# Patient Record
Sex: FEMALE | Race: WHITE | NOT HISPANIC OR LATINO | Employment: OTHER | URBAN - NONMETROPOLITAN AREA
[De-identification: names, ages, dates, MRNs, and addresses within clinical notes are randomized per-mention and may not be internally consistent; named-entity substitution may affect disease eponyms.]

---

## 2023-04-21 PROBLEM — E78.00 HYPERCHOLESTEROLEMIA: Status: ACTIVE | Noted: 2023-04-21

## 2023-04-21 PROBLEM — J01.90 ACUTE SINUSITIS: Status: ACTIVE | Noted: 2023-04-21

## 2023-04-21 PROBLEM — E55.9 VITAMIN D DEFICIENCY: Status: ACTIVE | Noted: 2023-04-21

## 2023-04-21 PROBLEM — R73.9 HYPERGLYCEMIA: Status: ACTIVE | Noted: 2023-04-21

## 2023-04-21 PROBLEM — M85.80 OSTEOPENIA: Status: ACTIVE | Noted: 2023-04-21

## 2023-04-21 PROBLEM — R53.83 FATIGUE: Status: ACTIVE | Noted: 2023-04-21

## 2023-04-21 PROBLEM — R92.8 ABNORMAL MAMMOGRAM: Status: ACTIVE | Noted: 2023-04-21

## 2023-04-21 PROBLEM — E66.01 OBESITY, MORBID (MORE THAN 100 LBS OVER IDEAL WEIGHT OR BMI > 40) (MULTI): Status: ACTIVE | Noted: 2023-04-21

## 2023-04-21 PROBLEM — M19.90 ARTHRITIS: Status: ACTIVE | Noted: 2023-04-21

## 2023-04-21 PROBLEM — J18.9 PNEUMONIA: Status: ACTIVE | Noted: 2023-04-21

## 2023-04-21 PROBLEM — I10 HYPERTENSION: Status: ACTIVE | Noted: 2023-04-21

## 2023-04-21 PROBLEM — M25.531 RIGHT WRIST PAIN: Status: ACTIVE | Noted: 2023-04-21

## 2023-04-21 PROBLEM — R05.9 COUGH: Status: ACTIVE | Noted: 2023-04-21

## 2023-04-21 PROBLEM — R31.9 HEMATURIA: Status: ACTIVE | Noted: 2023-04-21

## 2023-04-21 PROBLEM — M81.0 POST-MENOPAUSAL OSTEOPOROSIS: Status: ACTIVE | Noted: 2023-04-21

## 2023-04-21 PROBLEM — C02.9 MALIGNANT NEOPLASM OF TONGUE (MULTI): Status: ACTIVE | Noted: 2023-04-21

## 2023-04-21 PROBLEM — E87.6 HYPOKALEMIA: Status: ACTIVE | Noted: 2023-04-21

## 2023-04-21 PROBLEM — L03.119 CELLULITIS OF ANKLE: Status: ACTIVE | Noted: 2023-04-21

## 2023-04-21 PROBLEM — J30.9 ALLERGIC RHINITIS: Status: ACTIVE | Noted: 2023-04-21

## 2023-04-21 PROBLEM — L25.9 CONTACT DERMATITIS: Status: ACTIVE | Noted: 2023-04-21

## 2023-04-21 RX ORDER — CHOLECALCIFEROL (VITAMIN D3) 125 MCG
125 CAPSULE ORAL
COMMUNITY
Start: 2014-04-07

## 2023-04-21 RX ORDER — ASPIRIN 81 MG/1
TABLET ORAL
COMMUNITY
End: 2024-01-29 | Stop reason: ALTCHOICE

## 2023-04-21 RX ORDER — HYDROCHLOROTHIAZIDE 25 MG/1
1 TABLET ORAL DAILY
COMMUNITY
Start: 2019-05-16 | End: 2023-04-27 | Stop reason: SDUPTHER

## 2023-04-27 ENCOUNTER — OFFICE VISIT (OUTPATIENT)
Dept: PRIMARY CARE | Facility: CLINIC | Age: 68
End: 2023-04-27
Payer: MEDICARE

## 2023-04-27 VITALS
SYSTOLIC BLOOD PRESSURE: 158 MMHG | DIASTOLIC BLOOD PRESSURE: 78 MMHG | WEIGHT: 277.8 LBS | HEART RATE: 81 BPM | BODY MASS INDEX: 45.18 KG/M2 | OXYGEN SATURATION: 96 %

## 2023-04-27 DIAGNOSIS — M85.859 OSTEOPENIA OF NECK OF FEMUR, UNSPECIFIED LATERALITY: ICD-10-CM

## 2023-04-27 DIAGNOSIS — E55.9 VITAMIN D DEFICIENCY: ICD-10-CM

## 2023-04-27 DIAGNOSIS — E66.01 CLASS 3 SEVERE OBESITY DUE TO EXCESS CALORIES WITH SERIOUS COMORBIDITY AND BODY MASS INDEX (BMI) OF 45.0 TO 49.9 IN ADULT (MULTI): ICD-10-CM

## 2023-04-27 DIAGNOSIS — C02.9 MALIGNANT NEOPLASM OF TONGUE (MULTI): ICD-10-CM

## 2023-04-27 DIAGNOSIS — I10 HYPERTENSION, UNSPECIFIED TYPE: Primary | ICD-10-CM

## 2023-04-27 DIAGNOSIS — E78.00 HYPERCHOLESTEROLEMIA: ICD-10-CM

## 2023-04-27 PROBLEM — H25.13 CATARACT, NUCLEAR SCLEROTIC SENILE, BILATERAL: Status: ACTIVE | Noted: 2018-03-22

## 2023-04-27 PROBLEM — H35.372 EPIRETINAL MEMBRANE (ERM) OF LEFT EYE: Status: ACTIVE | Noted: 2018-03-22

## 2023-04-27 PROBLEM — E66.813 OBESITY, CLASS III, BMI 40-49.9 (MORBID OBESITY): Status: ACTIVE | Noted: 2018-04-09

## 2023-04-27 PROCEDURE — 3077F SYST BP >= 140 MM HG: CPT | Performed by: INTERNAL MEDICINE

## 2023-04-27 PROCEDURE — 1160F RVW MEDS BY RX/DR IN RCRD: CPT | Performed by: INTERNAL MEDICINE

## 2023-04-27 PROCEDURE — 3078F DIAST BP <80 MM HG: CPT | Performed by: INTERNAL MEDICINE

## 2023-04-27 PROCEDURE — 1036F TOBACCO NON-USER: CPT | Performed by: INTERNAL MEDICINE

## 2023-04-27 PROCEDURE — 3008F BODY MASS INDEX DOCD: CPT | Performed by: INTERNAL MEDICINE

## 2023-04-27 PROCEDURE — 1159F MED LIST DOCD IN RCRD: CPT | Performed by: INTERNAL MEDICINE

## 2023-04-27 PROCEDURE — 99214 OFFICE O/P EST MOD 30 MIN: CPT | Performed by: INTERNAL MEDICINE

## 2023-04-27 RX ORDER — OFLOXACIN 3 MG/ML
SOLUTION/ DROPS OPHTHALMIC
COMMUNITY
Start: 2018-03-27 | End: 2023-07-24

## 2023-04-27 RX ORDER — FUROSEMIDE 20 MG/1
20 TABLET ORAL 2 TIMES DAILY
COMMUNITY
End: 2023-07-24

## 2023-04-27 RX ORDER — KETOROLAC TROMETHAMINE 5 MG/ML
SOLUTION OPHTHALMIC
COMMUNITY
Start: 2018-03-27 | End: 2023-07-24

## 2023-04-27 RX ORDER — HYDROCHLOROTHIAZIDE 25 MG/1
25 TABLET ORAL DAILY
Qty: 90 TABLET | Refills: 0 | Status: SHIPPED | OUTPATIENT
Start: 2023-04-27 | End: 2023-07-24 | Stop reason: SDUPTHER

## 2023-04-27 RX ORDER — VALSARTAN 160 MG/1
160 TABLET ORAL
COMMUNITY
Start: 2018-02-14 | End: 2023-04-27 | Stop reason: SDUPTHER

## 2023-04-27 NOTE — PROGRESS NOTES
Subjective   Patient ID: Joy L Pallant is a 68 y.o. female who presents for Follow-up (3 mth medical management).    HPI  Patient here for routine follow-up    Mammogram and bone density reviewed    Hypertension on therapy no side effects  Home /66 this morning    History of tongue cancer stable ENT following    Hypercholesterolemia on diet    Osteopenia  /  Vitamin D deficiency on supplementation no side effects    Diet and exercise reviewed    Review of Systems   All other systems reviewed and are negative.      Objective   Physical Exam  Vitals reviewed.   Constitutional:       Appearance: Normal appearance. She is obese.   HENT:      Head: Normocephalic and atraumatic.      Mouth/Throat:      Pharynx: No posterior oropharyngeal erythema.   Eyes:      General: No scleral icterus.     Conjunctiva/sclera: Conjunctivae normal.      Pupils: Pupils are equal, round, and reactive to light.   Cardiovascular:      Rate and Rhythm: Normal rate and regular rhythm.      Heart sounds: Normal heart sounds.   Pulmonary:      Effort: No respiratory distress.      Breath sounds: No wheezing.   Abdominal:      General: Abdomen is flat. Bowel sounds are normal. There is no distension.      Palpations: Abdomen is soft. There is no mass.      Tenderness: There is no abdominal tenderness. There is no rebound.   Musculoskeletal:         General: Normal range of motion.      Cervical back: Normal range of motion and neck supple.   Skin:     General: Skin is warm and dry.   Neurological:      General: No focal deficit present.      Mental Status: She is alert and oriented to person, place, and time. Mental status is at baseline.   Psychiatric:         Mood and Affect: Mood normal.         Behavior: Behavior normal.         Thought Content: Thought content normal.         Judgment: Judgment normal.         Assessment/Plan   Problem List Items Addressed This Visit          Circulatory    Hypertension - Primary    Relevant  Medications    hydroCHLOROthiazide (HYDRODiuril) 25 mg tablet       Musculoskeletal    Osteopenia       Endocrine/Metabolic    Vitamin D deficiency       Other    Hypercholesterolemia    Malignant neoplasm of tongue (CMS/[facility])     Other Visit Diagnoses       Class 3 severe obesity due to excess calories with serious comorbidity and body mass index (BMI) of 45.0 to 49.9 in adult (CMS/[facility])              Mammogram and bone density reviewed    Hypertension on therapy no side effects  Home /66 this morning    History of tongue cancer stable ENT following    Hypercholesterolemia on diet    Osteopenia  /  Vitamin D deficiency on supplementation no side effects    Diet and exercise reviewed    Follow up 3 months / medicare physical

## 2023-05-25 RX ORDER — VALSARTAN 160 MG/1
160 TABLET ORAL
Qty: 90 TABLET | Refills: 0 | Status: SHIPPED | OUTPATIENT
Start: 2023-05-25 | End: 2023-07-24

## 2023-05-30 NOTE — TELEPHONE ENCOUNTER
PATIENT CALLED INTO THE ANSWERING SERVICE OVER THE LONG WEEKEND LETTING US KNOW THAT AN RX WAS AT HER PHARMACY THAT SHE DOES NOT NEED/ OR TAKE SO I CALLED THE PATIENT ON 5/30/2023 @ 8:15a TO SPEAK WITH HER TO GET MORE INFORMATION ON WHAT THE RX IS

## 2023-07-24 ENCOUNTER — OFFICE VISIT (OUTPATIENT)
Dept: PRIMARY CARE | Facility: CLINIC | Age: 68
End: 2023-07-24
Payer: MEDICARE

## 2023-07-24 VITALS
DIASTOLIC BLOOD PRESSURE: 82 MMHG | WEIGHT: 278.4 LBS | HEART RATE: 80 BPM | BODY MASS INDEX: 44.74 KG/M2 | HEIGHT: 66 IN | SYSTOLIC BLOOD PRESSURE: 142 MMHG | OXYGEN SATURATION: 96 %

## 2023-07-24 DIAGNOSIS — E55.9 VITAMIN D DEFICIENCY: ICD-10-CM

## 2023-07-24 DIAGNOSIS — I10 HYPERTENSION, UNSPECIFIED TYPE: ICD-10-CM

## 2023-07-24 DIAGNOSIS — R73.9 HYPERGLYCEMIA: ICD-10-CM

## 2023-07-24 DIAGNOSIS — Z00.00 ENCOUNTER FOR ANNUAL WELLNESS VISIT (AWV) IN MEDICARE PATIENT: Primary | ICD-10-CM

## 2023-07-24 DIAGNOSIS — E66.01 CLASS 3 SEVERE OBESITY DUE TO EXCESS CALORIES WITH SERIOUS COMORBIDITY AND BODY MASS INDEX (BMI) OF 40.0 TO 44.9 IN ADULT (MULTI): ICD-10-CM

## 2023-07-24 DIAGNOSIS — E78.00 HYPERCHOLESTEREMIA: ICD-10-CM

## 2023-07-24 DIAGNOSIS — C02.9 MALIGNANT NEOPLASM OF TONGUE (MULTI): ICD-10-CM

## 2023-07-24 PROCEDURE — 99215 OFFICE O/P EST HI 40 MIN: CPT | Performed by: INTERNAL MEDICINE

## 2023-07-24 PROCEDURE — 3008F BODY MASS INDEX DOCD: CPT | Performed by: INTERNAL MEDICINE

## 2023-07-24 PROCEDURE — 3079F DIAST BP 80-89 MM HG: CPT | Performed by: INTERNAL MEDICINE

## 2023-07-24 PROCEDURE — 3077F SYST BP >= 140 MM HG: CPT | Performed by: INTERNAL MEDICINE

## 2023-07-24 PROCEDURE — G0439 PPPS, SUBSEQ VISIT: HCPCS | Performed by: INTERNAL MEDICINE

## 2023-07-24 PROCEDURE — 1160F RVW MEDS BY RX/DR IN RCRD: CPT | Performed by: INTERNAL MEDICINE

## 2023-07-24 PROCEDURE — 1036F TOBACCO NON-USER: CPT | Performed by: INTERNAL MEDICINE

## 2023-07-24 PROCEDURE — G0447 BEHAVIOR COUNSEL OBESITY 15M: HCPCS | Performed by: INTERNAL MEDICINE

## 2023-07-24 PROCEDURE — 1170F FXNL STATUS ASSESSED: CPT | Performed by: INTERNAL MEDICINE

## 2023-07-24 PROCEDURE — 1159F MED LIST DOCD IN RCRD: CPT | Performed by: INTERNAL MEDICINE

## 2023-07-24 RX ORDER — HYDROCHLOROTHIAZIDE 25 MG/1
25 TABLET ORAL DAILY
Qty: 90 TABLET | Refills: 0 | Status: SHIPPED | OUTPATIENT
Start: 2023-07-24 | End: 2023-10-09 | Stop reason: SDUPTHER

## 2023-07-24 ASSESSMENT — ACTIVITIES OF DAILY LIVING (ADL)
TAKING_MEDICATION: INDEPENDENT
GROCERY_SHOPPING: INDEPENDENT
DOING_HOUSEWORK: INDEPENDENT
MANAGING_FINANCES: INDEPENDENT
BATHING: INDEPENDENT
DRESSING: INDEPENDENT

## 2023-07-24 ASSESSMENT — ENCOUNTER SYMPTOMS
LOSS OF SENSATION IN FEET: 0
DEPRESSION: 0
OCCASIONAL FEELINGS OF UNSTEADINESS: 0

## 2023-07-24 ASSESSMENT — PATIENT HEALTH QUESTIONNAIRE - PHQ9
1. LITTLE INTEREST OR PLEASURE IN DOING THINGS: NOT AT ALL
2. FEELING DOWN, DEPRESSED OR HOPELESS: NOT AT ALL
SUM OF ALL RESPONSES TO PHQ9 QUESTIONS 1 AND 2: 0

## 2023-07-25 NOTE — PROGRESS NOTES
"Subjective   Reason for Visit: Joy L Pallant is an 68 y.o. female here for a Medicare Wellness visit.     Past Medical, Surgical, and Family History reviewed and updated in chart.    Reviewed all medications by prescribing practitioner or clinical pharmacist (such as prescriptions, OTCs, herbal therapies and supplements) and documented in the medical record.    HPI    Routine follow up    Hypertension stable on therapy no side effects     History of tongue cancer stable ENT following     Hypercholesterolemia on diet     Osteopenia  /  Vitamin D deficiency on supplementation no side effects     Diet and exercise reviewed    Patient Care Team:  Mary Jane Alcantar MD as PCP - General  Mary Jane Alcantar MD as PCP - MSSP ACO Attributed Provider     Review of Systems   All other systems reviewed and are negative.      Objective   Vitals:  /82   Pulse 80   Ht 1.676 m (5' 6\")   Wt 126 kg (278 lb 6.4 oz)   SpO2 96%   BMI 44.93 kg/m²       Physical Exam  Vitals reviewed.   Constitutional:       Appearance: Normal appearance. She is obese.   HENT:      Head: Normocephalic and atraumatic.      Mouth/Throat:      Pharynx: No posterior oropharyngeal erythema.   Eyes:      General: No scleral icterus.     Conjunctiva/sclera: Conjunctivae normal.      Pupils: Pupils are equal, round, and reactive to light.   Cardiovascular:      Rate and Rhythm: Normal rate and regular rhythm.      Heart sounds: Normal heart sounds.   Pulmonary:      Effort: No respiratory distress.      Breath sounds: No wheezing.   Abdominal:      General: Abdomen is flat. Bowel sounds are normal. There is no distension.      Palpations: Abdomen is soft. There is no mass.      Tenderness: There is no abdominal tenderness. There is no rebound.   Musculoskeletal:         General: Normal range of motion.      Cervical back: Normal range of motion and neck supple.   Skin:     General: Skin is warm and dry.   Neurological:      General: No focal " deficit present.      Mental Status: She is alert and oriented to person, place, and time. Mental status is at baseline.   Psychiatric:         Mood and Affect: Mood normal.         Behavior: Behavior normal.         Thought Content: Thought content normal.         Judgment: Judgment normal.         Assessment/Plan   Problem List Items Addressed This Visit          Cardiac and Vasculature    Hypertension    Relevant Medications    hydroCHLOROthiazide (HYDRODiuril) 25 mg tablet    Other Relevant Orders    CBC and Auto Differential       Endocrine/Metabolic    Hyperglycemia    Relevant Orders    Hemoglobin A1C    Vitamin D deficiency    Relevant Orders    Vitamin D, Total       Hematology and Neoplasia    Malignant neoplasm of tongue (CMS/HCC)     Other Visit Diagnoses       Encounter for annual wellness visit (AWV) in Medicare patient    -  Primary    Hypercholesteremia        Relevant Orders    Comprehensive Metabolic Panel    Lipid Panel    TSH with reflex to Free T4 if abnormal    Class 3 severe obesity due to excess calories with serious comorbidity and body mass index (BMI) of 40.0 to 44.9 in adult (CMS/HCC)                Hypertension stable on therapy no side effects     History of tongue cancer stable ENT following     Hypercholesterolemia on diet     Osteopenia  /  Vitamin D deficiency on supplementation no side effects     Diet and exercise reviewed  I spent >15 minutes minutes face to face with individual  providing recommendations for nutrition choices and exercise plan to help achieve weight reduction.     Mammogram 3-23  Dexa 3-23  Colonoscopy pt declined    Check labs before appt    Follow up 3 months

## 2023-10-04 ENCOUNTER — LAB (OUTPATIENT)
Dept: LAB | Facility: LAB | Age: 68
End: 2023-10-04
Payer: MEDICARE

## 2023-10-04 DIAGNOSIS — R73.9 HYPERGLYCEMIA: ICD-10-CM

## 2023-10-04 DIAGNOSIS — I10 HYPERTENSION, UNSPECIFIED TYPE: ICD-10-CM

## 2023-10-04 DIAGNOSIS — E55.9 VITAMIN D DEFICIENCY: ICD-10-CM

## 2023-10-04 DIAGNOSIS — E78.00 HYPERCHOLESTEREMIA: ICD-10-CM

## 2023-10-04 LAB
25(OH)D3 SERPL-MCNC: 68 NG/ML (ref 30–100)
ALBUMIN SERPL BCP-MCNC: 4 G/DL (ref 3.4–5)
ALP SERPL-CCNC: 80 U/L (ref 33–136)
ALT SERPL W P-5'-P-CCNC: 19 U/L (ref 7–45)
ANION GAP SERPL CALC-SCNC: 13 MMOL/L (ref 10–20)
AST SERPL W P-5'-P-CCNC: 19 U/L (ref 9–39)
BASOPHILS # BLD AUTO: 0.07 X10*3/UL (ref 0–0.1)
BASOPHILS NFR BLD AUTO: 0.8 %
BILIRUB SERPL-MCNC: 0.5 MG/DL (ref 0–1.2)
BUN SERPL-MCNC: 19 MG/DL (ref 6–23)
CALCIUM SERPL-MCNC: 9.2 MG/DL (ref 8.6–10.3)
CHLORIDE SERPL-SCNC: 101 MMOL/L (ref 98–107)
CHOLEST SERPL-MCNC: 226 MG/DL (ref 0–199)
CHOLESTEROL/HDL RATIO: 4.3
CO2 SERPL-SCNC: 28 MMOL/L (ref 21–32)
CREAT SERPL-MCNC: 0.79 MG/DL (ref 0.5–1.05)
EOSINOPHIL # BLD AUTO: 0.25 X10*3/UL (ref 0–0.7)
EOSINOPHIL NFR BLD AUTO: 2.7 %
ERYTHROCYTE [DISTWIDTH] IN BLOOD BY AUTOMATED COUNT: 13.3 % (ref 11.5–14.5)
EST. AVERAGE GLUCOSE BLD GHB EST-MCNC: 128 MG/DL
GFR SERPL CREATININE-BSD FRML MDRD: 81 ML/MIN/1.73M*2
GLUCOSE SERPL-MCNC: 106 MG/DL (ref 74–99)
HBA1C MFR BLD: 6.1 %
HCT VFR BLD AUTO: 47.3 % (ref 36–46)
HDLC SERPL-MCNC: 52.9 MG/DL
HGB BLD-MCNC: 15.5 G/DL (ref 12–16)
IMM GRANULOCYTES # BLD AUTO: 0.07 X10*3/UL (ref 0–0.7)
IMM GRANULOCYTES NFR BLD AUTO: 0.8 % (ref 0–0.9)
LDLC SERPL CALC-MCNC: 149 MG/DL (ref 140–190)
LYMPHOCYTES # BLD AUTO: 2.24 X10*3/UL (ref 1.2–4.8)
LYMPHOCYTES NFR BLD AUTO: 24.1 %
MCH RBC QN AUTO: 30.2 PG (ref 26–34)
MCHC RBC AUTO-ENTMCNC: 32.8 G/DL (ref 32–36)
MCV RBC AUTO: 92 FL (ref 80–100)
MONOCYTES # BLD AUTO: 0.9 X10*3/UL (ref 0.1–1)
MONOCYTES NFR BLD AUTO: 9.7 %
NEUTROPHILS # BLD AUTO: 5.76 X10*3/UL (ref 1.2–7.7)
NEUTROPHILS NFR BLD AUTO: 61.9 %
NON HDL CHOLESTEROL: 173 MG/DL (ref 0–149)
NRBC BLD-RTO: 0 /100 WBCS (ref 0–0)
PLATELET # BLD AUTO: 323 X10*3/UL (ref 150–450)
PMV BLD AUTO: 9.9 FL (ref 7.5–11.5)
POTASSIUM SERPL-SCNC: 3.8 MMOL/L (ref 3.5–5.3)
PROT SERPL-MCNC: 6.9 G/DL (ref 6.4–8.2)
RBC # BLD AUTO: 5.14 X10*6/UL (ref 4–5.2)
SODIUM SERPL-SCNC: 138 MMOL/L (ref 136–145)
TRIGL SERPL-MCNC: 123 MG/DL (ref 0–149)
TSH SERPL-ACNC: 0.97 MIU/L (ref 0.44–3.98)
VLDL: 25 MG/DL (ref 0–40)
WBC # BLD AUTO: 9.3 X10*3/UL (ref 4.4–11.3)

## 2023-10-04 PROCEDURE — 80050 GENERAL HEALTH PANEL: CPT

## 2023-10-04 PROCEDURE — 82306 VITAMIN D 25 HYDROXY: CPT

## 2023-10-04 PROCEDURE — 83036 HEMOGLOBIN GLYCOSYLATED A1C: CPT

## 2023-10-04 PROCEDURE — 80061 LIPID PANEL: CPT

## 2023-10-04 PROCEDURE — 36415 COLL VENOUS BLD VENIPUNCTURE: CPT

## 2023-10-05 RX ORDER — CEPHALEXIN 500 MG/1
TABLET ORAL EVERY 6 HOURS
COMMUNITY
End: 2023-10-09 | Stop reason: ALTCHOICE

## 2023-10-05 RX ORDER — VALSARTAN 80 MG/1
TABLET ORAL EVERY 24 HOURS
COMMUNITY
End: 2023-10-09 | Stop reason: ALTCHOICE

## 2023-10-09 ENCOUNTER — OFFICE VISIT (OUTPATIENT)
Dept: PRIMARY CARE | Facility: CLINIC | Age: 68
End: 2023-10-09
Payer: MEDICARE

## 2023-10-09 VITALS
BODY MASS INDEX: 43.87 KG/M2 | SYSTOLIC BLOOD PRESSURE: 118 MMHG | OXYGEN SATURATION: 96 % | DIASTOLIC BLOOD PRESSURE: 74 MMHG | WEIGHT: 271.8 LBS | HEART RATE: 83 BPM

## 2023-10-09 DIAGNOSIS — R06.2 NOCTURNAL COUGH WITH WHEEZE: Primary | ICD-10-CM

## 2023-10-09 DIAGNOSIS — I10 HYPERTENSION, UNSPECIFIED TYPE: ICD-10-CM

## 2023-10-09 DIAGNOSIS — R05.8 NOCTURNAL COUGH WITH WHEEZE: Primary | ICD-10-CM

## 2023-10-09 DIAGNOSIS — C02.9 MALIGNANT NEOPLASM OF TONGUE (MULTI): ICD-10-CM

## 2023-10-09 DIAGNOSIS — R73.9 HYPERGLYCEMIA: ICD-10-CM

## 2023-10-09 DIAGNOSIS — E78.00 HYPERCHOLESTEREMIA: ICD-10-CM

## 2023-10-09 DIAGNOSIS — J30.9 ALLERGIC RHINITIS, UNSPECIFIED SEASONALITY, UNSPECIFIED TRIGGER: ICD-10-CM

## 2023-10-09 DIAGNOSIS — E66.01 CLASS 3 SEVERE OBESITY DUE TO EXCESS CALORIES WITH SERIOUS COMORBIDITY AND BODY MASS INDEX (BMI) OF 40.0 TO 44.9 IN ADULT (MULTI): ICD-10-CM

## 2023-10-09 PROCEDURE — 3078F DIAST BP <80 MM HG: CPT | Performed by: INTERNAL MEDICINE

## 2023-10-09 PROCEDURE — 3008F BODY MASS INDEX DOCD: CPT | Performed by: INTERNAL MEDICINE

## 2023-10-09 PROCEDURE — 1159F MED LIST DOCD IN RCRD: CPT | Performed by: INTERNAL MEDICINE

## 2023-10-09 PROCEDURE — 3074F SYST BP LT 130 MM HG: CPT | Performed by: INTERNAL MEDICINE

## 2023-10-09 PROCEDURE — 1160F RVW MEDS BY RX/DR IN RCRD: CPT | Performed by: INTERNAL MEDICINE

## 2023-10-09 PROCEDURE — 1036F TOBACCO NON-USER: CPT | Performed by: INTERNAL MEDICINE

## 2023-10-09 PROCEDURE — G0446 INTENS BEHAVE THER CARDIO DX: HCPCS | Performed by: INTERNAL MEDICINE

## 2023-10-09 PROCEDURE — 99214 OFFICE O/P EST MOD 30 MIN: CPT | Performed by: INTERNAL MEDICINE

## 2023-10-09 RX ORDER — HYDROCHLOROTHIAZIDE 25 MG/1
25 TABLET ORAL DAILY
Qty: 90 TABLET | Refills: 0 | Status: SHIPPED | OUTPATIENT
Start: 2023-10-09 | End: 2023-12-17 | Stop reason: HOSPADM

## 2023-10-09 RX ORDER — FLUTICASONE PROPIONATE 50 MCG
1 SPRAY, SUSPENSION (ML) NASAL DAILY
Qty: 16 G | Refills: 1 | Status: SHIPPED | OUTPATIENT
Start: 2023-10-09 | End: 2023-12-21 | Stop reason: ALTCHOICE

## 2023-10-09 RX ORDER — ALBUTEROL SULFATE 90 UG/1
2 AEROSOL, METERED RESPIRATORY (INHALATION) EVERY 4 HOURS PRN
Qty: 18 G | Refills: 0 | Status: SHIPPED | OUTPATIENT
Start: 2023-10-09 | End: 2023-10-09

## 2023-10-09 ASSESSMENT — ENCOUNTER SYMPTOMS
WHEEZING: 1
COUGH: 1
HYPERTENSION: 1

## 2023-10-09 NOTE — PROGRESS NOTES
Subjective   Patient ID: Joy L Pallant is a 68 y.o. female who presents for Med Management, Hypertension, Hyperlipidemia, Hyperglycemia, Wheezing, and Cough.  Hypertension    Hyperlipidemia    Hyperglycemia  Associated symptoms include coughing.   Wheezing   Associated symptoms include coughing.   Cough  Associated symptoms include wheezing.   Routine follow up    Labs rev'd    Wheeze, cough clear sputum at night x few weeks  No fever or dyspnea  Hold cxr pending flonase    Allergic rhinitis worse at night  Start flonase as directed    Hypertension stable on therapy no side effects  130-140/ 60-70's at home     Hyperglycemia on diet  HBA1C 6.1   10-23    History of tongue cancer stable ENT following     Hypercholesterolemia on diet     Osteopenia  /  Vitamin D deficiency on supplementation no side effects     Diet and exercise reviewed      Mammogram 3-23  Dexa 3-23  Colonoscopy pt declined    Review of Systems   Respiratory:  Positive for cough and wheezing.    All other systems reviewed and are negative.      Objective   /74   Pulse 83   Wt 123 kg (271 lb 12.8 oz)   SpO2 96%   BMI 43.87 kg/m²   Lab Results   Component Value Date    WBC 9.3 10/04/2023    HGB 15.5 10/04/2023    HCT 47.3 (H) 10/04/2023     10/04/2023    CHOL 226 (H) 10/04/2023    TRIG 123 10/04/2023    HDL 52.9 10/04/2023    ALT 19 10/04/2023    AST 19 10/04/2023     10/04/2023    K 3.8 10/04/2023     10/04/2023    CREATININE 0.79 10/04/2023    BUN 19 10/04/2023    CO2 28 10/04/2023    TSH 0.97 10/04/2023    HGBA1C 6.1 (H) 10/04/2023           Physical Exam  Vitals reviewed.   Constitutional:       Appearance: Normal appearance. She is obese.   HENT:      Head: Normocephalic and atraumatic.      Mouth/Throat:      Pharynx: No posterior oropharyngeal erythema.   Eyes:      General: No scleral icterus.     Conjunctiva/sclera: Conjunctivae normal.      Pupils: Pupils are equal, round, and reactive to light.   Cardiovascular:       Rate and Rhythm: Normal rate and regular rhythm.      Heart sounds: Normal heart sounds.   Pulmonary:      Effort: No respiratory distress.      Breath sounds: No wheezing.   Abdominal:      General: Abdomen is flat. Bowel sounds are normal. There is no distension.      Palpations: Abdomen is soft. There is no mass.      Tenderness: There is no abdominal tenderness. There is no rebound.   Musculoskeletal:         General: Normal range of motion.      Cervical back: Normal range of motion and neck supple.   Skin:     General: Skin is warm and dry.   Neurological:      General: No focal deficit present.      Mental Status: She is alert and oriented to person, place, and time. Mental status is at baseline.   Psychiatric:         Mood and Affect: Mood normal.         Behavior: Behavior normal.         Thought Content: Thought content normal.         Judgment: Judgment normal.       Problem List Items Addressed This Visit             ICD-10-CM       Cardiac and Vasculature    Hypertension I10    Relevant Medications    hydroCHLOROthiazide (HYDRODiuril) 25 mg tablet       ENT    Allergic rhinitis J30.9    Relevant Medications    fluticasone (Flonase) 50 mcg/actuation nasal spray       Endocrine/Metabolic    Hyperglycemia R73.9       Hematology and Neoplasia    Malignant neoplasm of tongue (CMS/[facility]) C02.9     Other Visit Diagnoses         Codes    Nocturnal cough with wheeze    -  Primary R05.8, R06.2    Relevant Orders    XR chest 2 views    Hypercholesteremia     E78.00    Class 3 severe obesity due to excess calories with serious comorbidity and body mass index (BMI) of 40.0 to 44.9 in adult (CMS/[facility])     E66.01, Z68.41          Assessment/Plan     Labs rev'd    Wheeze, cough clear sputum at night x few weeks  No fever or dyspnea  Hold cxr pending flonase    Allergic rhinitis worse at night  Start flonase as directed    Hypertension stable on therapy no side effects  130-140/ 60-70's at home     Hyperglycemia on  diet  HBA1C  6.1   10-23    History of tongue cancer stable ENT following     Hypercholesterolemia on diet  I spent 15 minutes face-to-face with this individual discussing their cardiovascular risk and behavioral therapies of nutritional choices, exercise, and elimination of habits contributing to risk. We agreed on plan how they may be able to reduce their current cardiovascular risk.  Patient 10 year cardiac risk estimate calculates :   8.5   %      Osteopenia  /  Vitamin D deficiency on supplementation no side effects     Diet and exercise reviewed      Mammogram 3-23  Dexa 3-23  Colonoscopy pt declined    Follow up 3 months

## 2023-10-31 ENCOUNTER — ANCILLARY PROCEDURE (OUTPATIENT)
Dept: RADIOLOGY | Facility: CLINIC | Age: 68
End: 2023-10-31
Payer: MEDICARE

## 2023-10-31 DIAGNOSIS — R06.2 NOCTURNAL COUGH WITH WHEEZE: ICD-10-CM

## 2023-10-31 DIAGNOSIS — R05.8 NOCTURNAL COUGH WITH WHEEZE: ICD-10-CM

## 2023-10-31 PROCEDURE — 71046 X-RAY EXAM CHEST 2 VIEWS: CPT | Mod: FY

## 2023-10-31 PROCEDURE — 71046 X-RAY EXAM CHEST 2 VIEWS: CPT | Performed by: RADIOLOGY

## 2023-12-15 ENCOUNTER — APPOINTMENT (OUTPATIENT)
Dept: CARDIOLOGY | Facility: HOSPITAL | Age: 68
DRG: 309 | End: 2023-12-15
Payer: MEDICARE

## 2023-12-15 ENCOUNTER — HOSPITAL ENCOUNTER (INPATIENT)
Facility: HOSPITAL | Age: 68
LOS: 2 days | Discharge: HOME | DRG: 309 | End: 2023-12-17
Attending: FAMILY MEDICINE | Admitting: NURSE PRACTITIONER
Payer: MEDICARE

## 2023-12-15 ENCOUNTER — APPOINTMENT (OUTPATIENT)
Dept: RADIOLOGY | Facility: HOSPITAL | Age: 68
DRG: 309 | End: 2023-12-15
Payer: MEDICARE

## 2023-12-15 DIAGNOSIS — I48.91 A-FIB (MULTI): Primary | ICD-10-CM

## 2023-12-15 DIAGNOSIS — R79.89 TROPONIN LEVEL ELEVATED: ICD-10-CM

## 2023-12-15 DIAGNOSIS — R94.31 ABNORMAL ELECTROCARDIOGRAM (ECG) (EKG): ICD-10-CM

## 2023-12-15 DIAGNOSIS — I10 PRIMARY HYPERTENSION: ICD-10-CM

## 2023-12-15 DIAGNOSIS — E78.00 HYPERCHOLESTEROLEMIA: ICD-10-CM

## 2023-12-15 LAB
ALBUMIN SERPL BCP-MCNC: 4.1 G/DL (ref 3.4–5)
ALP SERPL-CCNC: 86 U/L (ref 33–136)
ALT SERPL W P-5'-P-CCNC: 20 U/L (ref 7–45)
ANION GAP SERPL CALC-SCNC: 12 MMOL/L (ref 10–20)
AORTIC VALVE MEAN GRADIENT: 6
AORTIC VALVE PEAK VELOCITY: 1.91
APTT PPP: 37 SECONDS (ref 27–38)
AST SERPL W P-5'-P-CCNC: 19 U/L (ref 9–39)
AV PEAK GRADIENT: 14.6
AVA (PEAK VEL): 1.79
AVA (VTI): 2.35
BASOPHILS # BLD AUTO: 0.06 X10*3/UL (ref 0–0.1)
BASOPHILS NFR BLD AUTO: 0.6 %
BILIRUB SERPL-MCNC: 0.4 MG/DL (ref 0–1.2)
BNP SERPL-MCNC: 41 PG/ML (ref 0–99)
BUN SERPL-MCNC: 22 MG/DL (ref 6–23)
CALCIUM SERPL-MCNC: 9.4 MG/DL (ref 8.6–10.3)
CARDIAC TROPONIN I PNL SERPL HS: 118 NG/L (ref 0–13)
CARDIAC TROPONIN I PNL SERPL HS: 15 NG/L (ref 0–13)
CARDIAC TROPONIN I PNL SERPL HS: 354 NG/L (ref 0–13)
CARDIAC TROPONIN I PNL SERPL HS: 535 NG/L (ref 0–13)
CARDIAC TROPONIN I PNL SERPL HS: 593 NG/L (ref 0–13)
CARDIAC TROPONIN I PNL SERPL HS: 636 NG/L (ref 0–13)
CHLORIDE SERPL-SCNC: 100 MMOL/L (ref 98–107)
CO2 SERPL-SCNC: 29 MMOL/L (ref 21–32)
CREAT SERPL-MCNC: 0.88 MG/DL (ref 0.5–1.05)
D DIMER PPP FEU-MCNC: 299 NG/ML FEU
EJECTION FRACTION APICAL 4 CHAMBER: 60.2
EJECTION FRACTION: 56
EOSINOPHIL # BLD AUTO: 0.35 X10*3/UL (ref 0–0.7)
EOSINOPHIL NFR BLD AUTO: 3.6 %
ERYTHROCYTE [DISTWIDTH] IN BLOOD BY AUTOMATED COUNT: 13.6 % (ref 11.5–14.5)
GFR SERPL CREATININE-BSD FRML MDRD: 71 ML/MIN/1.73M*2
GLUCOSE SERPL-MCNC: 153 MG/DL (ref 74–99)
HCT VFR BLD AUTO: 51 % (ref 36–46)
HGB BLD-MCNC: 16.4 G/DL (ref 12–16)
HOLD SPECIMEN: NORMAL
HOLD SPECIMEN: NORMAL
IMM GRANULOCYTES # BLD AUTO: 0.06 X10*3/UL (ref 0–0.7)
IMM GRANULOCYTES NFR BLD AUTO: 0.6 % (ref 0–0.9)
INR PPP: 1 (ref 0.9–1.1)
LEFT ATRIUM VOLUME AREA LENGTH INDEX BSA: 39
LEFT VENTRICLE INTERNAL DIMENSION DIASTOLE: 4.19 (ref 3.5–6)
LEFT VENTRICULAR OUTFLOW TRACT DIAMETER: 2
LYMPHOCYTES # BLD AUTO: 3.18 X10*3/UL (ref 1.2–4.8)
LYMPHOCYTES NFR BLD AUTO: 32.4 %
MAGNESIUM SERPL-MCNC: 2.08 MG/DL (ref 1.6–2.4)
MCH RBC QN AUTO: 29.2 PG (ref 26–34)
MCHC RBC AUTO-ENTMCNC: 32.2 G/DL (ref 32–36)
MCV RBC AUTO: 91 FL (ref 80–100)
MITRAL VALVE E/A RATIO: 0.8
MITRAL VALVE E/E' RATIO: 11.76
MONOCYTES # BLD AUTO: 1 X10*3/UL (ref 0.1–1)
MONOCYTES NFR BLD AUTO: 10.2 %
NEUTROPHILS # BLD AUTO: 5.17 X10*3/UL (ref 1.2–7.7)
NEUTROPHILS NFR BLD AUTO: 52.6 %
NRBC BLD-RTO: 0 /100 WBCS (ref 0–0)
PLATELET # BLD AUTO: 320 X10*3/UL (ref 150–450)
POTASSIUM SERPL-SCNC: 3.4 MMOL/L (ref 3.5–5.3)
PROT SERPL-MCNC: 7.7 G/DL (ref 6.4–8.2)
PROTHROMBIN TIME: 11.2 SECONDS (ref 9.8–12.8)
RBC # BLD AUTO: 5.61 X10*6/UL (ref 4–5.2)
RIGHT VENTRICLE FREE WALL PEAK S': 15.2
RIGHT VENTRICLE PEAK SYSTOLIC PRESSURE: 31.5
SODIUM SERPL-SCNC: 138 MMOL/L (ref 136–145)
TRICUSPID ANNULAR PLANE SYSTOLIC EXCURSION: 1.8
TSH SERPL-ACNC: 0.61 MIU/L (ref 0.44–3.98)
UFH PPP CHRO-ACNC: 1.9 IU/ML
WBC # BLD AUTO: 9.8 X10*3/UL (ref 4.4–11.3)

## 2023-12-15 PROCEDURE — 93306 TTE W/DOPPLER COMPLETE: CPT | Mod: IPSPLIT

## 2023-12-15 PROCEDURE — 85025 COMPLETE CBC W/AUTO DIFF WBC: CPT | Performed by: FAMILY MEDICINE

## 2023-12-15 PROCEDURE — 85379 FIBRIN DEGRADATION QUANT: CPT | Mod: IPSPLIT

## 2023-12-15 PROCEDURE — 84484 ASSAY OF TROPONIN QUANT: CPT | Mod: IPSPLIT

## 2023-12-15 PROCEDURE — 84484 ASSAY OF TROPONIN QUANT: CPT | Performed by: FAMILY MEDICINE

## 2023-12-15 PROCEDURE — 96376 TX/PRO/DX INJ SAME DRUG ADON: CPT | Mod: IPSPLIT

## 2023-12-15 PROCEDURE — 36415 COLL VENOUS BLD VENIPUNCTURE: CPT | Performed by: FAMILY MEDICINE

## 2023-12-15 PROCEDURE — 93005 ELECTROCARDIOGRAM TRACING: CPT

## 2023-12-15 PROCEDURE — 83735 ASSAY OF MAGNESIUM: CPT | Mod: IPSPLIT

## 2023-12-15 PROCEDURE — 2500000001 HC RX 250 WO HCPCS SELF ADMINISTERED DRUGS (ALT 637 FOR MEDICARE OP): Mod: IPSPLIT | Performed by: NURSE PRACTITIONER

## 2023-12-15 PROCEDURE — 85520 HEPARIN ASSAY: CPT | Mod: IPSPLIT | Performed by: FAMILY MEDICINE

## 2023-12-15 PROCEDURE — 2500000002 HC RX 250 W HCPCS SELF ADMINISTERED DRUGS (ALT 637 FOR MEDICARE OP, ALT 636 FOR OP/ED): Mod: IPSPLIT

## 2023-12-15 PROCEDURE — 2500000004 HC RX 250 GENERAL PHARMACY W/ HCPCS (ALT 636 FOR OP/ED): Mod: IPSPLIT

## 2023-12-15 PROCEDURE — 99222 1ST HOSP IP/OBS MODERATE 55: CPT | Performed by: NURSE PRACTITIONER

## 2023-12-15 PROCEDURE — 71045 X-RAY EXAM CHEST 1 VIEW: CPT | Performed by: RADIOLOGY

## 2023-12-15 PROCEDURE — 2500000004 HC RX 250 GENERAL PHARMACY W/ HCPCS (ALT 636 FOR OP/ED): Performed by: FAMILY MEDICINE

## 2023-12-15 PROCEDURE — 83880 ASSAY OF NATRIURETIC PEPTIDE: CPT | Performed by: FAMILY MEDICINE

## 2023-12-15 PROCEDURE — 2500000001 HC RX 250 WO HCPCS SELF ADMINISTERED DRUGS (ALT 637 FOR MEDICARE OP): Mod: IPSPLIT

## 2023-12-15 PROCEDURE — 2020000001 HC ICU ROOM DAILY: Mod: IPSPLIT

## 2023-12-15 PROCEDURE — 71045 X-RAY EXAM CHEST 1 VIEW: CPT | Mod: IPSPLIT

## 2023-12-15 PROCEDURE — 84443 ASSAY THYROID STIM HORMONE: CPT | Mod: IPSPLIT

## 2023-12-15 PROCEDURE — 93306 TTE W/DOPPLER COMPLETE: CPT | Performed by: INTERNAL MEDICINE

## 2023-12-15 PROCEDURE — 85730 THROMBOPLASTIN TIME PARTIAL: CPT | Performed by: FAMILY MEDICINE

## 2023-12-15 PROCEDURE — 96365 THER/PROPH/DIAG IV INF INIT: CPT | Mod: IPSPLIT

## 2023-12-15 PROCEDURE — 80053 COMPREHEN METABOLIC PANEL: CPT | Performed by: FAMILY MEDICINE

## 2023-12-15 PROCEDURE — 85610 PROTHROMBIN TIME: CPT | Performed by: FAMILY MEDICINE

## 2023-12-15 PROCEDURE — 99285 EMERGENCY DEPT VISIT HI MDM: CPT | Performed by: FAMILY MEDICINE

## 2023-12-15 PROCEDURE — 84484 ASSAY OF TROPONIN QUANT: CPT | Mod: IPSPLIT | Performed by: NURSE PRACTITIONER

## 2023-12-15 PROCEDURE — 96367 TX/PROPH/DG ADDL SEQ IV INF: CPT | Mod: IPSPLIT

## 2023-12-15 PROCEDURE — 2500000005 HC RX 250 GENERAL PHARMACY W/O HCPCS

## 2023-12-15 PROCEDURE — 99223 1ST HOSP IP/OBS HIGH 75: CPT

## 2023-12-15 PROCEDURE — 97165 OT EVAL LOW COMPLEX 30 MIN: CPT | Mod: GO,IPSPLIT

## 2023-12-15 PROCEDURE — 2500000005 HC RX 250 GENERAL PHARMACY W/O HCPCS: Performed by: FAMILY MEDICINE

## 2023-12-15 RX ORDER — ONDANSETRON HYDROCHLORIDE 2 MG/ML
4 INJECTION, SOLUTION INTRAVENOUS EVERY 8 HOURS PRN
Status: DISCONTINUED | OUTPATIENT
Start: 2023-12-15 | End: 2023-12-17 | Stop reason: HOSPADM

## 2023-12-15 RX ORDER — DILTIAZEM HYDROCHLORIDE 5 MG/ML
INJECTION INTRAVENOUS
Status: COMPLETED
Start: 2023-12-15 | End: 2023-12-15

## 2023-12-15 RX ORDER — HYDRALAZINE HYDROCHLORIDE 20 MG/ML
10 INJECTION INTRAMUSCULAR; INTRAVENOUS ONCE
Status: COMPLETED | OUTPATIENT
Start: 2023-12-15 | End: 2023-12-15

## 2023-12-15 RX ORDER — ONDANSETRON 4 MG/1
4 TABLET, FILM COATED ORAL EVERY 8 HOURS PRN
Status: DISCONTINUED | OUTPATIENT
Start: 2023-12-15 | End: 2023-12-17 | Stop reason: HOSPADM

## 2023-12-15 RX ORDER — ACETAMINOPHEN 325 MG/1
650 TABLET ORAL EVERY 4 HOURS PRN
Status: DISCONTINUED | OUTPATIENT
Start: 2023-12-15 | End: 2023-12-17 | Stop reason: HOSPADM

## 2023-12-15 RX ORDER — POTASSIUM CHLORIDE 20 MEQ/1
40 TABLET, EXTENDED RELEASE ORAL ONCE
Status: COMPLETED | OUTPATIENT
Start: 2023-12-15 | End: 2023-12-15

## 2023-12-15 RX ORDER — METOPROLOL TARTRATE 25 MG/1
25 TABLET, FILM COATED ORAL 2 TIMES DAILY
Status: DISCONTINUED | OUTPATIENT
Start: 2023-12-15 | End: 2023-12-17 | Stop reason: HOSPADM

## 2023-12-15 RX ORDER — PANTOPRAZOLE SODIUM 40 MG/10ML
40 INJECTION, POWDER, LYOPHILIZED, FOR SOLUTION INTRAVENOUS
Status: DISCONTINUED | OUTPATIENT
Start: 2023-12-15 | End: 2023-12-17 | Stop reason: HOSPADM

## 2023-12-15 RX ORDER — ACETAMINOPHEN 500 MG
5000 TABLET ORAL DAILY
Status: DISCONTINUED | OUTPATIENT
Start: 2023-12-15 | End: 2023-12-17 | Stop reason: HOSPADM

## 2023-12-15 RX ORDER — ACETAMINOPHEN 650 MG/1
650 SUPPOSITORY RECTAL EVERY 4 HOURS PRN
Status: DISCONTINUED | OUTPATIENT
Start: 2023-12-15 | End: 2023-12-17 | Stop reason: HOSPADM

## 2023-12-15 RX ORDER — CLOPIDOGREL BISULFATE 75 MG/1
75 TABLET ORAL DAILY
Status: DISCONTINUED | OUTPATIENT
Start: 2023-12-15 | End: 2023-12-17 | Stop reason: HOSPADM

## 2023-12-15 RX ORDER — POLYETHYLENE GLYCOL 3350 17 G/17G
17 POWDER, FOR SOLUTION ORAL DAILY PRN
Status: DISCONTINUED | OUTPATIENT
Start: 2023-12-15 | End: 2023-12-17 | Stop reason: HOSPADM

## 2023-12-15 RX ORDER — ATORVASTATIN CALCIUM 40 MG/1
80 TABLET, FILM COATED ORAL NIGHTLY
Status: DISCONTINUED | OUTPATIENT
Start: 2023-12-15 | End: 2023-12-17 | Stop reason: HOSPADM

## 2023-12-15 RX ORDER — FLUTICASONE PROPIONATE 50 MCG
1 SPRAY, SUSPENSION (ML) NASAL DAILY
Status: DISCONTINUED | OUTPATIENT
Start: 2023-12-15 | End: 2023-12-16

## 2023-12-15 RX ORDER — TALC
9 POWDER (GRAM) TOPICAL NIGHTLY PRN
Status: DISCONTINUED | OUTPATIENT
Start: 2023-12-15 | End: 2023-12-17 | Stop reason: HOSPADM

## 2023-12-15 RX ORDER — DILTIAZEM HYDROCHLORIDE 5 MG/ML
20 INJECTION INTRAVENOUS ONCE
Status: COMPLETED | OUTPATIENT
Start: 2023-12-15 | End: 2023-12-15

## 2023-12-15 RX ORDER — LOSARTAN POTASSIUM 25 MG/1
25 TABLET ORAL DAILY
Status: DISCONTINUED | OUTPATIENT
Start: 2023-12-15 | End: 2023-12-17 | Stop reason: HOSPADM

## 2023-12-15 RX ORDER — PANTOPRAZOLE SODIUM 40 MG/1
40 TABLET, DELAYED RELEASE ORAL
Status: DISCONTINUED | OUTPATIENT
Start: 2023-12-15 | End: 2023-12-17 | Stop reason: HOSPADM

## 2023-12-15 RX ORDER — HEPARIN SODIUM 5000 [USP'U]/ML
5000-10000 INJECTION, SOLUTION INTRAVENOUS; SUBCUTANEOUS EVERY 4 HOURS PRN
Status: DISCONTINUED | OUTPATIENT
Start: 2023-12-15 | End: 2023-12-15

## 2023-12-15 RX ORDER — ACETAMINOPHEN 160 MG/5ML
650 SOLUTION ORAL EVERY 4 HOURS PRN
Status: DISCONTINUED | OUTPATIENT
Start: 2023-12-15 | End: 2023-12-17 | Stop reason: HOSPADM

## 2023-12-15 RX ORDER — HYDRALAZINE HYDROCHLORIDE 20 MG/ML
INJECTION INTRAMUSCULAR; INTRAVENOUS
Status: COMPLETED
Start: 2023-12-15 | End: 2023-12-15

## 2023-12-15 RX ORDER — ASPIRIN 81 MG/1
81 TABLET ORAL DAILY
Status: DISCONTINUED | OUTPATIENT
Start: 2023-12-15 | End: 2023-12-15

## 2023-12-15 RX ORDER — HYDROCHLOROTHIAZIDE 25 MG/1
25 TABLET ORAL DAILY
Status: DISCONTINUED | OUTPATIENT
Start: 2023-12-15 | End: 2023-12-17 | Stop reason: HOSPADM

## 2023-12-15 RX ORDER — HEPARIN SODIUM 10000 [USP'U]/100ML
0-4500 INJECTION, SOLUTION INTRAVENOUS CONTINUOUS
Status: DISCONTINUED | OUTPATIENT
Start: 2023-12-15 | End: 2023-12-15

## 2023-12-15 RX ORDER — HEPARIN SODIUM 5000 [USP'U]/ML
10000 INJECTION, SOLUTION INTRAVENOUS; SUBCUTANEOUS ONCE
Status: COMPLETED | OUTPATIENT
Start: 2023-12-15 | End: 2023-12-15

## 2023-12-15 RX ADMIN — HEPARIN SODIUM 10000 UNITS: 5000 INJECTION, SOLUTION INTRAVENOUS; SUBCUTANEOUS at 05:20

## 2023-12-15 RX ADMIN — ASPIRIN 81 MG: 81 TABLET, COATED ORAL at 09:58

## 2023-12-15 RX ADMIN — PANTOPRAZOLE SODIUM 40 MG: 40 TABLET, DELAYED RELEASE ORAL at 07:04

## 2023-12-15 RX ADMIN — APIXABAN 5 MG: 5 TABLET, FILM COATED ORAL at 13:38

## 2023-12-15 RX ADMIN — CLOPIDOGREL BISULFATE 75 MG: 75 TABLET ORAL at 17:44

## 2023-12-15 RX ADMIN — APIXABAN 5 MG: 5 TABLET, FILM COATED ORAL at 22:30

## 2023-12-15 RX ADMIN — LOSARTAN POTASSIUM 25 MG: 25 TABLET, FILM COATED ORAL at 13:38

## 2023-12-15 RX ADMIN — HEPARIN SODIUM 2000 UNITS/HR: 10000 INJECTION, SOLUTION INTRAVENOUS at 05:27

## 2023-12-15 RX ADMIN — DILTIAZEM HYDROCHLORIDE 20 MG: 5 INJECTION INTRAVENOUS at 03:09

## 2023-12-15 RX ADMIN — PERFLUTREN 2 ML OF DILUTION: 6.52 INJECTION, SUSPENSION INTRAVENOUS at 12:16

## 2023-12-15 RX ADMIN — Medication 9 MG: at 20:26

## 2023-12-15 RX ADMIN — POTASSIUM CHLORIDE 40 MEQ: 1500 TABLET, EXTENDED RELEASE ORAL at 09:59

## 2023-12-15 RX ADMIN — ATORVASTATIN CALCIUM 80 MG: 40 TABLET, FILM COATED ORAL at 20:26

## 2023-12-15 RX ADMIN — HYDRALAZINE HYDROCHLORIDE 10 MG: 20 INJECTION INTRAMUSCULAR; INTRAVENOUS at 21:02

## 2023-12-15 RX ADMIN — FLUTICASONE PROPIONATE 1 SPRAY: 50 SPRAY, METERED NASAL at 09:57

## 2023-12-15 RX ADMIN — HYDROCHLOROTHIAZIDE 25 MG: 25 TABLET ORAL at 09:57

## 2023-12-15 RX ADMIN — CHOLECALCIFEROL TAB 125 MCG (5000 UNIT) 5000 UNITS: 125 TAB at 09:58

## 2023-12-15 RX ADMIN — DILTIAZEM HYDROCHLORIDE 10 MG/HR: 5 INJECTION INTRAVENOUS at 03:10

## 2023-12-15 RX ADMIN — METOPROLOL TARTRATE 25 MG: 25 TABLET, FILM COATED ORAL at 20:26

## 2023-12-15 RX ADMIN — METOPROLOL TARTRATE 25 MG: 25 TABLET, FILM COATED ORAL at 09:57

## 2023-12-15 SDOH — SOCIAL STABILITY: SOCIAL INSECURITY: ARE YOU OR HAVE YOU BEEN THREATENED OR ABUSED PHYSICALLY, EMOTIONALLY, OR SEXUALLY BY ANYONE?: NO

## 2023-12-15 SDOH — ECONOMIC STABILITY: HOUSING INSECURITY
IN THE LAST 12 MONTHS, WAS THERE A TIME WHEN YOU DID NOT HAVE A STEADY PLACE TO SLEEP OR SLEPT IN A SHELTER (INCLUDING NOW)?: NO

## 2023-12-15 SDOH — SOCIAL STABILITY: SOCIAL INSECURITY: HAS ANYONE EVER THREATENED TO HURT YOUR FAMILY OR YOUR PETS?: NO

## 2023-12-15 SDOH — SOCIAL STABILITY: SOCIAL NETWORK: ARE YOU MARRIED, WIDOWED, DIVORCED, SEPARATED, NEVER MARRIED, OR LIVING WITH A PARTNER?: NEVER MARRIED

## 2023-12-15 SDOH — ECONOMIC STABILITY: TRANSPORTATION INSECURITY
IN THE PAST 12 MONTHS, HAS THE LACK OF TRANSPORTATION KEPT YOU FROM MEDICAL APPOINTMENTS OR FROM GETTING MEDICATIONS?: NO

## 2023-12-15 SDOH — ECONOMIC STABILITY: INCOME INSECURITY: HOW HARD IS IT FOR YOU TO PAY FOR THE VERY BASICS LIKE FOOD, HOUSING, MEDICAL CARE, AND HEATING?: NOT HARD AT ALL

## 2023-12-15 SDOH — SOCIAL STABILITY: SOCIAL INSECURITY: DO YOU FEEL ANYONE HAS EXPLOITED OR TAKEN ADVANTAGE OF YOU FINANCIALLY OR OF YOUR PERSONAL PROPERTY?: NO

## 2023-12-15 SDOH — SOCIAL STABILITY: SOCIAL INSECURITY: ARE THERE ANY APPARENT SIGNS OF INJURIES/BEHAVIORS THAT COULD BE RELATED TO ABUSE/NEGLECT?: NO

## 2023-12-15 SDOH — SOCIAL STABILITY: SOCIAL INSECURITY: WERE YOU ABLE TO COMPLETE ALL THE BEHAVIORAL HEALTH SCREENINGS?: YES

## 2023-12-15 SDOH — SOCIAL STABILITY: SOCIAL INSECURITY: DOES ANYONE TRY TO KEEP YOU FROM HAVING/CONTACTING OTHER FRIENDS OR DOING THINGS OUTSIDE YOUR HOME?: NO

## 2023-12-15 SDOH — ECONOMIC STABILITY: INCOME INSECURITY: IN THE PAST 12 MONTHS, HAS THE ELECTRIC, GAS, OIL, OR WATER COMPANY THREATENED TO SHUT OFF SERVICE IN YOUR HOME?: NO

## 2023-12-15 SDOH — SOCIAL STABILITY: SOCIAL INSECURITY: DO YOU FEEL UNSAFE GOING BACK TO THE PLACE WHERE YOU ARE LIVING?: NO

## 2023-12-15 SDOH — ECONOMIC STABILITY: INCOME INSECURITY: IN THE LAST 12 MONTHS, WAS THERE A TIME WHEN YOU WERE NOT ABLE TO PAY THE MORTGAGE OR RENT ON TIME?: NO

## 2023-12-15 SDOH — SOCIAL STABILITY: SOCIAL INSECURITY: ABUSE: ADULT

## 2023-12-15 SDOH — SOCIAL STABILITY: SOCIAL INSECURITY: HAVE YOU HAD THOUGHTS OF HARMING ANYONE ELSE?: NO

## 2023-12-15 SDOH — ECONOMIC STABILITY: HOUSING INSECURITY: IN THE LAST 12 MONTHS, HOW MANY PLACES HAVE YOU LIVED?: 1

## 2023-12-15 ASSESSMENT — COGNITIVE AND FUNCTIONAL STATUS - GENERAL
MOVING FROM LYING ON BACK TO SITTING ON SIDE OF FLAT BED WITH BEDRAILS: A LITTLE
MOBILITY SCORE: 22
CLIMB 3 TO 5 STEPS WITH RAILING: A LITTLE
DAILY ACTIVITIY SCORE: 24
DAILY ACTIVITIY SCORE: 24
PATIENT BASELINE BEDBOUND: NO

## 2023-12-15 ASSESSMENT — PATIENT HEALTH QUESTIONNAIRE - PHQ9
1. LITTLE INTEREST OR PLEASURE IN DOING THINGS: NOT AT ALL
SUM OF ALL RESPONSES TO PHQ9 QUESTIONS 1 & 2: 0
2. FEELING DOWN, DEPRESSED OR HOPELESS: NOT AT ALL
1. LITTLE INTEREST OR PLEASURE IN DOING THINGS: NOT AT ALL
SUM OF ALL RESPONSES TO PHQ9 QUESTIONS 1 & 2: 0
2. FEELING DOWN, DEPRESSED OR HOPELESS: NOT AT ALL

## 2023-12-15 ASSESSMENT — LIFESTYLE VARIABLES
HOW OFTEN DO YOU HAVE 6 OR MORE DRINKS ON ONE OCCASION: NEVER
HOW OFTEN DO YOU HAVE A DRINK CONTAINING ALCOHOL: NEVER
HOW MANY STANDARD DRINKS CONTAINING ALCOHOL DO YOU HAVE ON A TYPICAL DAY: PATIENT DOES NOT DRINK
SKIP TO QUESTIONS 9-10: 1
HOW OFTEN DO YOU HAVE A DRINK CONTAINING ALCOHOL: NEVER
SKIP TO QUESTIONS 9-10: 1
HOW MANY STANDARD DRINKS CONTAINING ALCOHOL DO YOU HAVE ON A TYPICAL DAY: PATIENT DOES NOT DRINK
HOW OFTEN DO YOU HAVE 6 OR MORE DRINKS ON ONE OCCASION: NEVER
AUDIT-C TOTAL SCORE: 0

## 2023-12-15 ASSESSMENT — ACTIVITIES OF DAILY LIVING (ADL)
WALKS IN HOME: INDEPENDENT
HEARING - LEFT EAR: FUNCTIONAL
ADL_ASSISTANCE: INDEPENDENT
HEARING - RIGHT EAR: FUNCTIONAL
PATIENT'S MEMORY ADEQUATE TO SAFELY COMPLETE DAILY ACTIVITIES?: YES
BATHING: INDEPENDENT
LACK_OF_TRANSPORTATION: NO
GROOMING: INDEPENDENT
FEEDING YOURSELF: INDEPENDENT
DRESSING YOURSELF: INDEPENDENT
TOILETING: INDEPENDENT
ADEQUATE_TO_COMPLETE_ADL: YES
BATHING_ASSISTANCE: MODIFIED INDEPENDENT (DEVICE)
JUDGMENT_ADEQUATE_SAFELY_COMPLETE_DAILY_ACTIVITIES: YES
LACK_OF_TRANSPORTATION: NO

## 2023-12-15 ASSESSMENT — PAIN SCALES - GENERAL
PAINLEVEL_OUTOF10: 7
PAINLEVEL_OUTOF10: 0 - NO PAIN
PAINLEVEL_OUTOF10: 1
PAINLEVEL_OUTOF10: 0 - NO PAIN

## 2023-12-15 ASSESSMENT — ENCOUNTER SYMPTOMS
CONSTITUTIONAL NEGATIVE: 1
NEUROLOGICAL NEGATIVE: 1
PSYCHIATRIC NEGATIVE: 1
CHEST TIGHTNESS: 1
EYES NEGATIVE: 1
MUSCULOSKELETAL NEGATIVE: 1
PALPITATIONS: 1
GASTROINTESTINAL NEGATIVE: 1
HEMATOLOGIC/LYMPHATIC NEGATIVE: 1
ALLERGIC/IMMUNOLOGIC NEGATIVE: 1
ENDOCRINE NEGATIVE: 1

## 2023-12-15 ASSESSMENT — COLUMBIA-SUICIDE SEVERITY RATING SCALE - C-SSRS
1. IN THE PAST MONTH, HAVE YOU WISHED YOU WERE DEAD OR WISHED YOU COULD GO TO SLEEP AND NOT WAKE UP?: NO
6. HAVE YOU EVER DONE ANYTHING, STARTED TO DO ANYTHING, OR PREPARED TO DO ANYTHING TO END YOUR LIFE?: NO
2. HAVE YOU ACTUALLY HAD ANY THOUGHTS OF KILLING YOURSELF?: NO

## 2023-12-15 ASSESSMENT — PAIN - FUNCTIONAL ASSESSMENT
PAIN_FUNCTIONAL_ASSESSMENT: 0-10

## 2023-12-15 ASSESSMENT — PAIN DESCRIPTION - DESCRIPTORS: DESCRIPTORS: ACHING

## 2023-12-15 ASSESSMENT — PAIN DESCRIPTION - PAIN TYPE: TYPE: ACUTE PAIN

## 2023-12-15 ASSESSMENT — PAIN DESCRIPTION - LOCATION: LOCATION: CHEST

## 2023-12-15 NOTE — CONSULTS
Cardiology Consult Note  Patient: Joy L Pallant  Unit/Bed: 03/03-A  YOB: 1955    Admitting Diagnosis: A-fib (CMS/[facility]) [I48.91]  Date:  12/15/2023  Hospital Day: 0  Attending: Geoff Ta MD      Chief Complaint   Patient presents with    Chest Pain        SUBJECTIVE:     History of Present Illness:  Joy L Pallant is a 68 y.o. female patient who is being seen at the request of Dr. Wright for inpatient consultation of  New onset atrial fibrillation with RVR. Presents to ED with complaints of  heart racing and light pressure to her chest. She attempted to take BP at home and cuff read error. She called EMS and took 4 81mg ASA. NO prior cardiac history. On arrival to ED found to be in Afib with RVR rate 160s. Started on Diltiazem and converted shortly after while still in the ED. No prior PHILLIP testing. Notes family history of Afib in her twin sister and mother. Home Bps typically 120-130/60-70s.     All previous records reviewed.    Review of Systems:   Review of Systems   All other systems reviewed and are negative.      Allergies:  Allergies   Allergen Reactions    Penicillins Hives and Rash      Home Medication:  Medications Prior to Admission   Medication Sig Dispense Refill Last Dose    aspirin 81 mg EC tablet Take by mouth.   12/15/2023    cholecalciferol (Vitamin D-3) 125 MCG (5000 UT) capsule Take by mouth.   Past Week    fluticasone (Flonase) 50 mcg/actuation nasal spray Administer 1 spray into each nostril once daily. Shake gently. Before first use, prime pump. After use, clean tip and replace cap. 16 g 1 Unknown    hydroCHLOROthiazide (HYDRODiuril) 25 mg tablet Take 1 tablet (25 mg) by mouth once daily. 90 tablet 0 12/14/2023      PMHx:  Past Medical History:   Diagnosis Date    Cellulitis of right lower limb 11/07/2019    Cellulitis of leg, right    Hypertension     Malignant neoplasm of tongue, unspecified (CMS/[facility]) 11/03/2022    Malignant neoplasm of tongue    Other abnormal glucose  11/17/2013    Abnormal glucose    Other specified health status 02/10/2014    ARB intolerance       PSHx:  Past Surgical History:   Procedure Laterality Date    OTHER SURGICAL HISTORY  01/25/2022    Tongue surgery    OTHER SURGICAL HISTORY  01/25/2022    Cataract surgery    OTHER SURGICAL HISTORY  01/21/2015    Excision, Lesion Floor Mouth    TONSILLECTOMY  04/14/2014    Tonsillectomy With Adenoidectomy       Social Hx:  Social History     Socioeconomic History    Marital status: Single     Spouse name: None    Number of children: None    Years of education: None    Highest education level: None   Occupational History    None   Tobacco Use    Smoking status: Never    Smokeless tobacco: Never   Vaping Use    Vaping Use: Never used   Substance and Sexual Activity    Alcohol use: Never    Drug use: Never    Sexual activity: None   Other Topics Concern    None   Social History Narrative    None     Social Determinants of Health     Financial Resource Strain: Unknown (12/15/2023)    Overall Financial Resource Strain (CARDIA)     Difficulty of Paying Living Expenses: Patient refused   Food Insecurity: Not on file   Transportation Needs: No Transportation Needs (12/15/2023)    PRAPARE - Transportation     Lack of Transportation (Medical): No     Lack of Transportation (Non-Medical): No   Physical Activity: Not on file   Stress: Not on file   Social Connections: Not on file   Intimate Partner Violence: Not on file   Housing Stability: Unknown (12/15/2023)    Housing Stability Vital Sign     Unable to Pay for Housing in the Last Year: Patient refused     Number of Places Lived in the Last Year: 1     Unstable Housing in the Last Year: Patient refused       Family Hx:  No family history on file.    OBJECTIVE  Vitals:   Visit Vitals  /67   Pulse 92   Temp 36 °C (96.8 °F) (Temporal)   Resp 16        Wt Readings from Last 5 Encounters:   12/15/23 123 kg (271 lb 4.8 oz)   10/09/23 123 kg (271 lb 12.8 oz)   07/24/23 126 kg  (278 lb 6.4 oz)   04/27/23 126 kg (277 lb 12.8 oz)   04/18/23 127 kg (280 lb)       Intake / Output:   No intake/output data recorded.     Tele monitoring: SR 80s    Physical Examination:    Vitals reviewed.   Constitutional:       General: Awake.      Appearance: Normal and healthy appearance. Well-developed and not in distress.   Eyes:      General: Lids are normal.      Pupils: Pupils are equal, round, and reactive to light.   HENT:      Nose: Nose normal.    Mouth/Throat:      Lips: Pink.      Mouth: Mucous membranes are moist.   Neck:      Vascular: JVD normal.   Pulmonary:      Effort: Pulmonary effort is normal.      Breath sounds: Normal breath sounds and air entry.   Chest:      Chest wall: Not tender to palpatation.   Cardiovascular:      PMI at left midclavicular line. Normal rate. Regular rhythm. Normal S1. Normal S2.       Murmurs: There is a grade 2/6 systolic murmur.   Edema:     Peripheral edema absent.   Abdominal:      General: Bowel sounds are normal.      Palpations: Abdomen is soft.      Tenderness: There is no abdominal tenderness.   Musculoskeletal: Normal range of motion.      Cervical back: Full passive range of motion without pain, normal range of motion and neck supple. Skin:     General: Skin is warm and dry.   Neurological:      General: No focal deficit present.      Mental Status: Alert, oriented to person, place, and time and oriented to person, place and time. Mental status is at baseline.   Psychiatric:         Attention and Perception: Attention and perception normal.         Mood and Affect: Mood and affect normal.         Speech: Speech normal.         Behavior: Behavior normal. Behavior is cooperative.         Thought Content: Thought content normal.          LABS:  CMP:   Recent Labs     12/15/23  0305 10/04/23  1004 08/17/22  0954 07/30/21  0941 08/20/20  0913 06/18/19  1431 04/12/19  1025 02/14/18  0951    138 139 140 139 137 140 139   K 3.4* 3.8 3.7 4.0 3.6 3.3* 3.9  "3.9    101 103 106 103 99 105 104   CO2 29 28 27 26 31 27 24 27   ANIONGAP 12 13 13 12 9* 14 15 12   BUN 22 19 16 18 16 19 23 19   CREATININE 0.88 0.79 0.69 0.78 0.78 0.79 0.78 0.74   EGFR 71 81  --   --   --   --   --   --      LIVER ENZYMES:   Recent Labs     12/15/23  0305 10/04/23  1004 08/17/22  0954 07/30/21  0941 08/20/20  0913 06/18/19  1431 04/12/19  1025 02/14/18  0951   ALBUMIN 4.1 4.0 3.9 3.7 3.8 4.1 4.0 4.1   ALT 20 19 16 18 16 17 17 15   AST 19 19 16 18 15 18 16 14   BILITOT 0.4 0.5 0.5 0.5 0.4 0.4 0.5 0.4     CBC:  Recent Labs     12/15/23  0305 10/04/23  1004 08/17/22  0954 07/30/21  0941 08/20/20  0913 07/10/19  1302 06/18/19  1431 04/12/19  1025   WBC 9.8 9.3 8.7 8.9 8.2 9.9 9.8 7.8   HGB 16.4* 15.5 14.9 14.1 14.9 14.2 14.3 13.6   HCT 51.0* 47.3* 46.1* 45.1 47.3* 44.5 44.9 43.4    323 285 274 306 350 318 292   MCV 91 92 92 95 93 93 94 94     COAG:   Recent Labs     12/15/23  0305   INR 1.0     ABO: No results for input(s): \"ABO\" in the last 34405 hours.  HEME/ENDO:  Recent Labs     10/04/23  1004 08/17/22  0954 07/30/21  0941   TSH 0.97 0.91 1.01   HGBA1C 6.1*  --  6.2      CARDIAC:   Recent Labs     12/15/23  0536 12/15/23  0400 12/15/23  0305   TROPHS 354* 118* 15*   BNP  --   --  41       Lipid Panel:  No results found for: \"HDL\", \"CHHDL\", \"VLDL\", \"TRIG\", \"NHDL\"     Current Medications:   MEDICATIONS  Infusions:  dilTIAZem, Last Rate: 10 mg/hr (12/15/23 0540)  heparin, Last Rate: 2,000 Units/hr (12/15/23 0527)      Scheduled:  aspirin, 81 mg, Daily  cholecalciferol, 5,000 Units, Daily  fluticasone, 1 spray, Daily  hydroCHLOROthiazide, 25 mg, Daily  metoprolol tartrate, 25 mg, BID  pantoprazole, 40 mg, Daily before breakfast   Or  pantoprazole, 40 mg, Daily before breakfast      PRN:  acetaminophen, 650 mg, q4h PRN   Or  acetaminophen, 650 mg, q4h PRN   Or  acetaminophen, 650 mg, q4h PRN  acetaminophen, 650 mg, q4h PRN   Or  acetaminophen, 650 mg, q4h PRN   Or  acetaminophen, 650 mg, " q4h PRN  heparin, 5,000-10,000 Units, q4h PRN  melatonin, 9 mg, Nightly PRN  ondansetron, 4 mg, q8h PRN   Or  ondansetron, 4 mg, q8h PRN  polyethylene glycol, 17 g, Daily PRN        Cardiovascular studies:    EKG:No results found for this or any previous visit (from the past 4464 hour(s)).  Echocardiogram: IMGRESULT(SPHA161:1:1825) No results found for this or any previous visit from the past 1825 days.    Stress Testing IMGRESULT(QRW6562:1:1825): No results found for this or any previous visit from the past 1825 days.    Cardiac Catheterization: No results found for this or any previous visit from the past 1825 days.  No results found for this or any previous visit from the past 3650 days.     Cardiac Scoring: No results found for this or any previous visit from the past 1825 days.    AAA : No results found for this or any previous visit from the past 1825 days.    OTHER: No results found for this or any previous visit from the past 1825 days.      LAST IMAGING RESULTS   XR chest 1 view  Narrative: Interpreted By:  Keo Reddy,   STUDY:  XR CHEST 1 VIEW;  12/15/2023 3:47 am      INDICATION:  Signs/Symptoms:chest pain.      COMPARISON:  10/31/2023      ACCESSION NUMBER(S):  [id number]      ORDERING CLINICIAN:  JEANA RICHARDSON      FINDINGS:  There is magnification of the cardiac silhouette secondary to AP  technique. No airspace consolidation or pleural effusion. No  pneumothorax. Degenerative change of bilateral acromioclavicular  joints.      Impression: No airspace consolidation or pleural effusion.      MACRO:  None      Signed by: Keo Reddy 12/15/2023 3:51 AM  Dictation workstation:   [id number]      Assessment:    Patient Active Problem List   Diagnosis    Abnormal mammogram    Acute sinusitis    Allergic rhinitis    Arthritis    Cellulitis of ankle    Obesity, Class III, BMI 40-49.9 (morbid obesity) (CMS/[facility])    Contact dermatitis    Cough    Fatigue    Hematuria    Hypercholesterolemia     Hyperglycemia    Hypertension    Hypokalemia    Malignant neoplasm of tongue (CMS/[facility])    Osteopenia    Pneumonia    Post-menopausal osteoporosis    Right wrist pain    Vitamin D deficiency    Cataract, nuclear sclerotic senile, bilateral    Epiretinal membrane (ERM) of left eye    A-fib (CMS/[facility])        Assessment/Plan   Problem List Items Addressed This Visit             ICD-10-CM    * (Principal) A-fib (CMS/[facility]) - Primary I48.91    Relevant Medications    dilTIAZem (Cardizem) 125 mg in dextrose 5 % 125 mL (1 mg/mL) infusion    dilTIAZem (Cardizem) injection 20 mg (Completed)    metoprolol tartrate (Lopressor) tablet 25 mg (Start on 12/15/2023  9:00 AM)    Other Relevant Orders    Transthoracic Echo (TTE) Complete     Other Visit Diagnoses         Codes    Troponin level elevated     R79.89    Abnormal electrocardiogram (ECG) (EKG)     R94.31    Relevant Orders    Transthoracic Echo (TTE) Complete          67 yo women admitted with New onset atrial fibrillation with RVR, spontaneously converted while on Diltiazem gtt  Elevated HS troponin  Systolic Murmur  ASCVD with high risk factors  Morbid Obesity  HTN  HLD    Plan:  Echocardiogram to assess LVEF and structural heart  Elevated HS troponin without ST changes or symptoms  Recommend trend until down trend   Start High intensity statin- Atorvastatin 80mg daily  Given high risk factors consider OP evaluation r/o inducible ischemia with elective stress testing  Start Losartan 25mg daily for cardio, renal protection of borderline DM  Atrial fibrillation management as follows:  -Apixaban 5mg BID  -Metoprolol 25mg BID  -OP PHILLIP testing  Supportive treatment per primary team  Close monitoring of electrolytes K>4 and Mag >2  Replace as needed to maintain goal     Discussed with Dr. Murphy    Thank you for the opportunity to participate in the care of your patient.  Do not hesitate to call if you have any questions.      Electronically signed by FER Thompson-OLIVER on  12/15/2023 at 8:54 AM  ____________________________________________________________  Division of Cardiovascular Medicine  [facility]  [facility]

## 2023-12-15 NOTE — PROGRESS NOTES
Occupational Therapy    Evaluation    Patient Name: Joy L Pallant  MRN: [id number]  Today's Date: 12/15/2023  Time Calculation  Start Time: 1030  Stop Time: 1050  Time Calculation (min): 20 min    Assessment  IP OT Assessment  OT Assessment: Pt is a 69 y/o female presenting with a fib. Pt displays no decrease from baseline function since hospital admission. No acute OT needs at this time.  Barriers to Discharge: None  Evaluation/Treatment Tolerance: Patient tolerated treatment well  Medical Staff Made Aware: Yes  End of Session Communication: Bedside nurse  End of Session Patient Position: Bed, 2 rail up, Alarm off, not on at start of session    Plan:  No Skilled OT: At baseline function  OT Frequency:  (evaluation only)  OT Discharge Recommendations: No further acute OT, No OT needed after discharge  OT Recommended Transfer Status: Independent  OT - OK to Discharge: Yes  Based on completed evaluation and care plan recommendations, no barriers to discharge to next site of care.    Subjective   Current Problem:  1. A-fib (CMS/HCC)  Transthoracic Echo (TTE) Complete    Transthoracic Echo (TTE) Complete      2. Troponin level elevated        3. Abnormal electrocardiogram (ECG) (EKG)  Transthoracic Echo (TTE) Complete    Transthoracic Echo (TTE) Complete        General:  General  Reason for Referral: assess ADLs/transfers  Referred By: YVONNE Abernathy  Past Medical History Relevant to Rehab: Pt presented to ED with heart palpatations; woke up at 2 am with heart racing. Found to be in a fib. (PMH: cellulitis, HTN, malignant neoplasm of tongue, abnormal glucuse, ARB intolerance)  Prior to Session Communication: Bedside nurse  Patient Position Received: Bed, 2 rail up, Alarm off, not on at start of session  General Comment: Pt pleseant and cooperative throughout session. Pt left with all needs in reach following session.    Precautions:  Medical Precautions: Fall precautions    Vital Signs:  Heart Rate: 83  Heart  Rate Source: Monitor  SpO2: 95 %    Pain:  Pain Assessment  Pain Assessment: 0-10  Pain Score: 0 - No pain    Objective   Cognition:  Overall Cognitive Status: Within Functional Limits     Home Living:  Type of Home: House  Lives With:  (sister)  Home Adaptive Equipment:  (3 w/c, FWW, standard walker, elevating bed (not hospital grade))  Home Layout: Two level, Full bath main level, Able to live on main level with bedroom/bathroom, Stairs to alternate level with rails (pt lives on first floor, sister lives on second)  Alternate Level Stairs-Rails:  (one side)  Alternate Level Stairs-Number of Steps: 13  Home Access: Ramped entrance  Bathroom Shower/Tub: Walk-in shower  Bathroom Toilet: Standard  Bathroom Equipment: Grab bars in shower, Grab bars around toilet, Shower chair with back  Home Living Comments: pt has no concerns for home going     Prior Function:  Level of Craig: Independent with ADLs and functional transfers, Independent with homemaking with ambulation  Receives Help From:  (sister)  ADL Assistance: Independent  Homemaking Assistance: Independent  Ambulatory Assistance: Independent  Vocational: Retired    IADL History:  Homemaking Responsibilities: Yes (sister and her share)  Current License: Yes  Mode of Transportation: Car    ADL:  Eating Assistance: Independent  Grooming Assistance: Independent  Bathing Assistance: Modified independent (Device)  Bathing Deficit: Increased time to complete   UE Dressing Assistance: Independent  LE Dressing Assistance: Modified independent (Device)  LE Dressing Deficit: Increased time to complete (pt displayed good balance ewith sock donning/doffing and pants donning)  Toileting Assistance with Device: Independent    Activity Tolerance:  Endurance: Endurance does not limit participation in activity    Bed Mobility/Transfers:   Bed Mobility  Bed Mobility: Yes  Bed Mobility 1  Bed Mobility 1: Supine to sitting  Level of Assistance 1: Modified independent  Bed  Mobility Comments 1: using bed rails  Bed Mobility 2  Bed Mobility  2: Sitting to supine  Level of Assistance 2: Independent    Transfers  Transfer: Yes  Transfer 1  Transfer From 1: Bed to  Transfer to 1: Stand, Sit  Technique 1: Sit to stand, Stand to sit  Transfer Level of Assistance 1: Independent  Trials/Comments 1: completed safely with ease.    Ambulation/Gait Training:  Ambulation/Gait Training  Ambulation/Gait Training Performed: Yes  Ambulation/Gait Training 1  Surface 1: Level tile  Device 1: No device  Assistance 1: Independent    Sitting Balance:  Static Sitting Balance  Static Sitting-Balance Support: Feet supported, Bilateral upper extremity supported  Static Sitting-Level of Assistance: Independent  Dynamic Sitting Balance  Dynamic Sitting-Balance Support: No upper extremity supported, Feet supported  Dynamic Sitting-Balance: Lateral lean, Forward lean, Reaching across midline  Dynamic Sitting-Comments: during LBD, independent    Standing Balance:  Static Standing Balance  Static Standing-Balance Support: No upper extremity supported  Static Standing-Level of Assistance: Independent     IADL's:   Homemaking Responsibilities: Yes (sister and her share)  Current License: Yes  Mode of Transportation: Car    Sensation:  Sensation Comment: no deficits reported    Strength:  Strength Comments: MICHEAL UE WFL    Coordination:  Movements are Fluid and Coordinated: Yes     Hand Function:  Hand Function  Gross Grasp: Functional  Coordination: Functional    Extremities:   RUE   RUE : Within Functional Limits and LUE   LUE: Within Functional Limits    Outcome Measures:   [facility] Daily Activity  Putting on and taking off regular lower body clothing: None  Bathing (including washing, rinsing, drying): None  Putting on and taking off regular upper body clothing: None  Toileting, which includes using toilet, bedpan or urinal: None  Taking care of personal grooming such as brushing teeth: None  Eating Meals: None  Daily  Activity - Total Score: 24      Education Documentation  No documentation found.  Education Comments  No comments found.

## 2023-12-15 NOTE — ED PROVIDER NOTES
HPI   Chief Complaint   Patient presents with    Chest Pain       68-year-old female comes to the ED with complaint of feeling lightheaded and having palpitations with chest wall pain that began earlier today while she was at rest.  Patient reports he took some baby aspirin helps for resolution however it continued to be an issue and she called EMS and has to be brought to the ED.  Patient upon arrival to ED was alert, cooperative, appears anxious, uncomfortable, and noted to be an A-fib with RVR on EKG.  Patient currently denies headache, neck pain, back pain, abdominal pain, fevers, falls, recent travel, sick contacts, vomiting/diarrhea, dysuria, dizziness, and weakness.      History provided by:  Patient, EMS personnel and medical records   used: No                        Robert Coma Scale Score: 15                  Patient History   Past Medical History:   Diagnosis Date    Cellulitis of right lower limb 11/07/2019    Cellulitis of leg, right    Hypertension     Malignant neoplasm of tongue, unspecified (CMS/[facility]) 11/03/2022    Malignant neoplasm of tongue    Other abnormal glucose 11/17/2013    Abnormal glucose    Other specified health status 02/10/2014    ARB intolerance     Past Surgical History:   Procedure Laterality Date    OTHER SURGICAL HISTORY  01/25/2022    Tongue surgery    OTHER SURGICAL HISTORY  01/25/2022    Cataract surgery    OTHER SURGICAL HISTORY  01/21/2015    Excision, Lesion Floor Mouth    TONSILLECTOMY  04/14/2014    Tonsillectomy With Adenoidectomy     No family history on file.  Social History     Tobacco Use    Smoking status: Never    Smokeless tobacco: Never   Vaping Use    Vaping Use: Never used   Substance Use Topics    Alcohol use: Never    Drug use: Never       Physical Exam   ED Triage Vitals   Temp Heart Rate Resp BP   12/15/23 0305 12/15/23 0305 12/15/23 0305 12/15/23 0305   36.6 °C (97.9 °F) (!) 160 20 (!) 192/86      SpO2 Temp Source Heart Rate Source  Patient Position   12/15/23 0305 12/15/23 0305 -- 12/15/23 0500   93 % Tympanic  Lying      BP Location FiO2 (%)     -- --             Physical Exam  Vitals and nursing note reviewed.   Constitutional:       General: She is not in acute distress.     Appearance: She is well-developed.   HENT:      Head: Normocephalic and atraumatic.   Eyes:      Conjunctiva/sclera: Conjunctivae normal.   Cardiovascular:      Rate and Rhythm: Tachycardia present. Rhythm irregular.      Pulses: Normal pulses.      Heart sounds: Normal heart sounds, S1 normal and S2 normal. No murmur heard.  Pulmonary:      Effort: Pulmonary effort is normal. No respiratory distress.      Breath sounds: Normal breath sounds.   Abdominal:      Palpations: Abdomen is soft.      Tenderness: There is no abdominal tenderness.   Musculoskeletal:         General: No swelling.      Cervical back: Neck supple.   Skin:     General: Skin is warm and dry.      Capillary Refill: Capillary refill takes less than 2 seconds.   Neurological:      General: No focal deficit present.      Mental Status: She is alert and oriented to person, place, and time.      GCS: GCS eye subscore is 4. GCS verbal subscore is 5. GCS motor subscore is 6.      Cranial Nerves: Cranial nerves 2-12 are intact.      Sensory: Sensation is intact.      Motor: Motor function is intact.      Coordination: Coordination is intact.      Gait: Gait is intact.   Psychiatric:         Mood and Affect: Mood normal.         ED Course & MDM   Diagnoses as of 12/15/23 0546   A-fib (CMS/[facility])     Labs Reviewed   CBC WITH AUTO DIFFERENTIAL - Abnormal       Result Value    WBC 9.8      nRBC 0.0      RBC 5.61 (*)     Hemoglobin 16.4 (*)     Hematocrit 51.0 (*)     MCV 91      MCH 29.2      MCHC 32.2      RDW 13.6      Platelets 320      Neutrophils % 52.6      Immature Granulocytes %, Automated 0.6      Lymphocytes % 32.4      Monocytes % 10.2      Eosinophils % 3.6      Basophils % 0.6      Neutrophils Absolute  5.17      Immature Granulocytes Absolute, Automated 0.06      Lymphocytes Absolute 3.18      Monocytes Absolute 1.00      Eosinophils Absolute 0.35      Basophils Absolute 0.06     COMPREHENSIVE METABOLIC PANEL - Abnormal    Glucose 153 (*)     Sodium 138      Potassium 3.4 (*)     Chloride 100      Bicarbonate 29      Anion Gap 12      Urea Nitrogen 22      Creatinine 0.88      eGFR 71      Calcium 9.4      Albumin 4.1      Alkaline Phosphatase 86      Total Protein 7.7      AST 19      Bilirubin, Total 0.4      ALT 20     SERIAL TROPONIN-INITIAL - Abnormal    Troponin I, High Sensitivity 15 (*)     Narrative:     Less than 99th percentile of normal range cutoff-  Female and children under 18 years old <14 ng/L; Male <21 ng/L: Negative  Repeat testing should be performed if clinically indicated.     Female and children under 18 years old 14-50 ng/L; Male 21-50 ng/L:  Consistent with possible cardiac damage and possible increased clinical   risk. Serial measurements may help to assess extent of myocardial damage.     >50 ng/L: Consistent with cardiac damage, increased clinical risk and  myocardial infarction. Serial measurements may help assess extent of   myocardial damage.      NOTE: Children less than 1 year old may have higher baseline troponin   levels and results should be interpreted in conjunction with the overall   clinical context.     NOTE: Troponin I testing is performed using a different   testing methodology at [facility] than at other   [facility]. Direct result comparisons should only   be made within the same method.   SERIAL TROPONIN, 1 HOUR - Abnormal    Troponin I, High Sensitivity 118 (*)     Narrative:     Less than 99th percentile of normal range cutoff-  Female and children under 18 years old <14 ng/L; Male <21 ng/L: Negative  Repeat testing should be performed if clinically indicated.     Female and children under 18 years old 14-50 ng/L; Male 21-50 ng/L:  Consistent with  possible cardiac damage and possible increased clinical   risk. Serial measurements may help to assess extent of myocardial damage.     >50 ng/L: Consistent with cardiac damage, increased clinical risk and  myocardial infarction. Serial measurements may help assess extent of   myocardial damage.      NOTE: Children less than 1 year old may have higher baseline troponin   levels and results should be interpreted in conjunction with the overall   clinical context.     NOTE: Troponin I testing is performed using a different   testing methodology at [facility] than at other   [facility]. Direct result comparisons should only   be made within the same method.   B-TYPE NATRIURETIC PEPTIDE - Normal    BNP 41      Narrative:        <100 pg/mL - Heart failure unlikely  100-299 pg/mL - Intermediate probability of acute heart                  failure exacerbation. Correlate with clinical                  context and patient history.    >=300 pg/mL - Heart Failure likely. Correlate with clinical                  context and patient history.    BNP testing is performed using different testing methodology at [facility] than at other [facility]. Direct result comparisons should only be made within the same method.      PROTIME-INR - Normal    Protime 11.2      INR 1.0     APTT - Normal    aPTT 37      Narrative:     The APTT is no longer used for monitoring Unfractionated Heparin Therapy. For monitoring Heparin Therapy, use the Heparin Assay.   TROPONIN SERIES- (INITIAL, 1 HR)    Narrative:     The following orders were created for panel order Troponin Series, (0, 1 HR).  Procedure                               Abnormality         Status                     ---------                               -----------         ------                     Troponin I, High Sensiti...[[id number]]  Abnormal            Final result               Troponin, High Sensitivi...[[id number]]  Abnormal             Final result                 Please view results for these tests on the individual orders.   TROPONIN I, HIGH SENSITIVITY   HEPARIN ASSAY     XR chest 1 view   Final Result   No airspace consolidation or pleural effusion.        MACRO:   None        Signed by: Keo Reddy 12/15/2023 3:51 AM   Dictation workstation:   [id number]        0258 -- A-fib with .  Normal axis.  Normal intervals.  Disrupted DE interval.  Nonspecific ST-T changes. Changed compared to old EKG    0336 -- NSR rate 79.  Normal axis.  Normal intervals.  Nonspecific ST-T changes with no signs of ischemia.  LVH.  Normal EKG with no findings of STEMI. Unchanged compared to old EKG      CRITICAL CARE TIME    CRITICAL CARE :  The high probability of clinically significant deterioration in the patient’s condition required my highest level of medical decision making and my highest level of preparedness to intervene emergently.     I provided [x]32 minutes of critical care, not including other billable services/procedures.    The patient was reevaluated/re-examined multiple times during the visit. Critical care time includes management at bedside, discussion with other providers and consultants, family counseling and answering questions, and documentation. Care involves decision making of high complexity to assess, manipulate, and support vital organ system failure and/or to prevent further life threatening deterioration of the patient's condition. Failure to initiate these interventions on an urgent basis would likely result in sudden, clinically significant or life threatening deterioration in the patient's condition of new onset A-fib with RVR requiring IV Cardizem drip and IV heparin      Medical Decision Making  Patient on arrival to the ED appeared to be anxious and uncomfortable with noted tachyarrhythmia concerning for A-fib with RVR.  Discussed with patient presenting complaints clinical findings.  Reviewed with patient her epic chart  and counseled patient on palpitations and appropriate approach to management/treatments.  After assessment and evaluation's finding and evaluation and EKG confirmed A-fib with RVR as being new onset and requiring intervention.  IV line was started, labs sent, imaging ordered, given IV fluids, given IV Cardizem bolus, started IV Cardizem drip, given IV heparin, and continued on cardiac monitor.  After period of rest patient was reassessed found to have converted to normal sinus rhythm which was confirmed on repeat EKG.  Patient this time had resolution of her chest pain as well as any sensations of palpitations.  Patient initial results were reviewed discussed with her and patient was stabilized in the ED.  Patient's repeat opponent was noted to be much more elevated the first 1 and at this time further discussion was had with Dr. Murphy on-call cardiology regarding her presentation and care.  At this time he recommended patient to be admitted to the ICU here at [facility] with consultation to his services for continued management of the elevated troponins and new onset A-fib with RVR.  At time of this discussion was also had with on-call general medicine and patient was excepted to the ICU under their care with consultation to cardiology.  Patient continue maintain stability in the ED with resolution of her chest pain and converted to normal sinus rhythm.  Patient stable and transferred to the floor.    Amount and/or Complexity of Data Reviewed  External Data Reviewed: labs and radiology.  Labs: ordered. Decision-making details documented in ED Course.  Radiology: ordered. Decision-making details documented in ED Course.  ECG/medicine tests: ordered and independent interpretation performed. Decision-making details documented in ED Course.    Risk  Decision regarding hospitalization.        Procedure  Procedures     Geoff Ta MD  12/15/23 [id number]

## 2023-12-15 NOTE — ED TRIAGE NOTES
Pt to ED via EMS c/o chest pain that woke her up out of her sleep, pt believed it to be indigestion but it did not improve so she too 4 baby aspirin at home and called 911, EMS gave 1nitro, upon arrival to ED pt states he chest pain is 1/10, EKG performed that shoes a-fib RVR, pt has no hx of a-marybeth, MD at bedside assessing pt, pt placed on cardiac monitor and cont pulse ox, EMS placed IV and lester blood, second IV placed in ED.

## 2023-12-15 NOTE — H&P
"History Of Present Illness  Joy L Pallant is a 68 y.o. female presenting with complaints of palpitations. Pt states that about 2am she woke up out of a sleep because she could feel her heart racing. She attempted to get her blood pressure with a home cuff but kept getting \"error\". She called the ambulance and they told her to take 4 baby ASA in addition to her daily baby ASA. She denies any chest pain but did feel some heaviness when she could feel her heart racing. Pt denies any shortness of breath or other associated symptoms. Pt was found to be in a-fib with ventricular rate in 160s. She was started on IV diltiazem and IV heparin. Pt converted to NSR in ED. Was admitted to ICU for medical management.    ED VS: T36.6, , RR 20, /86, Sp02 93%RA    Imaging: CXR- No airspace consolidation or pleural effusion.      Labs: Glu 153, Na 138, K 3.4, Bun/creat 22/0.88, BNP 41, Trop 15, WBC 9.8, H/H 16.4/51.0, Plt 320       Past Medical History  Past Medical History:   Diagnosis Date    Cellulitis of right lower limb 11/07/2019    Cellulitis of leg, right    Hypertension     Malignant neoplasm of tongue, unspecified (CMS/HCC) 11/03/2022    Malignant neoplasm of tongue    Other abnormal glucose 11/17/2013    Abnormal glucose    Other specified health status 02/10/2014    ARB intolerance       Surgical History  Past Surgical History:   Procedure Laterality Date    OTHER SURGICAL HISTORY  01/25/2022    Tongue surgery    OTHER SURGICAL HISTORY  01/25/2022    Cataract surgery    OTHER SURGICAL HISTORY  01/21/2015    Excision, Lesion Floor Mouth    TONSILLECTOMY  04/14/2014    Tonsillectomy With Adenoidectomy        Social History  She reports that she has never smoked. She has never used smokeless tobacco. She reports that she does not drink alcohol and does not use drugs.    Family History  Family History   Problem Relation Name Age of Onset    Heart attack Mother      Atrial fibrillation Mother      Atrial " "fibrillation Sister          Allergies  Penicillins    Review of Systems   Constitutional: Negative.    HENT: Negative.     Eyes: Negative.    Respiratory:  Positive for chest tightness.    Cardiovascular:  Positive for palpitations.   Gastrointestinal: Negative.    Endocrine: Negative.    Genitourinary: Negative.    Musculoskeletal: Negative.    Allergic/Immunologic: Negative.    Neurological: Negative.    Hematological: Negative.    Psychiatric/Behavioral: Negative.          Physical Exam  Constitutional:       Appearance: She is obese.   HENT:      Head: Normocephalic.      Nose: Nose normal.      Mouth/Throat:      Pharynx: Oropharynx is clear.   Cardiovascular:      Rate and Rhythm: Regular rhythm. Bradycardia present.      Heart sounds: Murmur heard.   Pulmonary:      Breath sounds: Normal breath sounds.   Abdominal:      General: Bowel sounds are normal.      Palpations: Abdomen is soft.   Musculoskeletal:         General: Normal range of motion.      Cervical back: Normal range of motion and neck supple.   Skin:     General: Skin is dry.   Neurological:      Mental Status: She is alert and oriented to person, place, and time.   Psychiatric:         Mood and Affect: Mood normal.         Behavior: Behavior normal.          Last Recorded Vitals  Blood pressure 137/67, pulse 92, temperature 36 °C (96.8 °F), temperature source Temporal, resp. rate 16, height 1.676 m (5' 6\"), weight 123 kg (271 lb 4.8 oz), SpO2 94 %.    Relevant Results  Scheduled medications  aspirin, 81 mg, oral, Daily  cholecalciferol, 5,000 Units, oral, Daily  fluticasone, 1 spray, Each Nostril, Daily  hydroCHLOROthiazide, 25 mg, oral, Daily  metoprolol tartrate, 25 mg, oral, BID  pantoprazole, 40 mg, oral, Daily before breakfast   Or  pantoprazole, 40 mg, intravenous, Daily before breakfast      Continuous medications  dilTIAZem, 5-15 mg/hr, Last Rate: 10 mg/hr (12/15/23 0908)  heparin, 0-4,500 Units/hr, Last Rate: 2,000 Units/hr (12/15/23 " 0527)      PRN medications  PRN medications: acetaminophen **OR** acetaminophen **OR** acetaminophen, acetaminophen **OR** acetaminophen **OR** acetaminophen, heparin, melatonin, ondansetron **OR** ondansetron, polyethylene glycol    Results for orders placed or performed during the hospital encounter of 12/15/23 (from the past 24 hour(s))   CBC and Auto Differential   Result Value Ref Range    WBC 9.8 4.4 - 11.3 x10*3/uL    nRBC 0.0 0.0 - 0.0 /100 WBCs    RBC 5.61 (H) 4.00 - 5.20 x10*6/uL    Hemoglobin 16.4 (H) 12.0 - 16.0 g/dL    Hematocrit 51.0 (H) 36.0 - 46.0 %    MCV 91 80 - 100 fL    MCH 29.2 26.0 - 34.0 pg    MCHC 32.2 32.0 - 36.0 g/dL    RDW 13.6 11.5 - 14.5 %    Platelets 320 150 - 450 x10*3/uL    Neutrophils % 52.6 40.0 - 80.0 %    Immature Granulocytes %, Automated 0.6 0.0 - 0.9 %    Lymphocytes % 32.4 13.0 - 44.0 %    Monocytes % 10.2 2.0 - 10.0 %    Eosinophils % 3.6 0.0 - 6.0 %    Basophils % 0.6 0.0 - 2.0 %    Neutrophils Absolute 5.17 1.20 - 7.70 x10*3/uL    Immature Granulocytes Absolute, Automated 0.06 0.00 - 0.70 x10*3/uL    Lymphocytes Absolute 3.18 1.20 - 4.80 x10*3/uL    Monocytes Absolute 1.00 0.10 - 1.00 x10*3/uL    Eosinophils Absolute 0.35 0.00 - 0.70 x10*3/uL    Basophils Absolute 0.06 0.00 - 0.10 x10*3/uL   Comprehensive metabolic panel   Result Value Ref Range    Glucose 153 (H) 74 - 99 mg/dL    Sodium 138 136 - 145 mmol/L    Potassium 3.4 (L) 3.5 - 5.3 mmol/L    Chloride 100 98 - 107 mmol/L    Bicarbonate 29 21 - 32 mmol/L    Anion Gap 12 10 - 20 mmol/L    Urea Nitrogen 22 6 - 23 mg/dL    Creatinine 0.88 0.50 - 1.05 mg/dL    eGFR 71 >60 mL/min/1.73m*2    Calcium 9.4 8.6 - 10.3 mg/dL    Albumin 4.1 3.4 - 5.0 g/dL    Alkaline Phosphatase 86 33 - 136 U/L    Total Protein 7.7 6.4 - 8.2 g/dL    AST 19 9 - 39 U/L    Bilirubin, Total 0.4 0.0 - 1.2 mg/dL    ALT 20 7 - 45 U/L   B-Type Natriuretic Peptide   Result Value Ref Range    BNP 41 0 - 99 pg/mL   Protime-INR   Result Value Ref Range     Protime 11.2 9.8 - 12.8 seconds    INR 1.0 0.9 - 1.1   APTT   Result Value Ref Range    aPTT 37 27 - 38 seconds   Troponin I, High Sensitivity, Initial   Result Value Ref Range    Troponin I, High Sensitivity 15 (H) 0 - 13 ng/L   Troponin, High Sensitivity, 1 Hour   Result Value Ref Range    Troponin I, High Sensitivity 118 (HH) 0 - 13 ng/L   Troponin I, High Sensitivity   Result Value Ref Range    Troponin I, High Sensitivity 354 (HH) 0 - 13 ng/L   D-dimer, VTE Exclusion   Result Value Ref Range    D-Dimer, Quantitative VTE Exclusion 299 <=500 ng/mL FEU          Assessment/Plan   Principal Problem:    A-fib (CMS/HCC)      #New onset atrial fibrillation with RVR  #NSTEMI vs Non-MI related elevated troponins  #Essential HTN  -Afib with rate in 160s in ED  -Converted to NSR with IV diltiazem  -Trop 15 > 118 > 354 >   -BNP 41  -Mg pending   -TSH pending  -Echo pending   -Heparin drip started in ED; continue per protocol  -DC IV diltiazem, started metoprolol 20mg BID  -Cardiac monitoring  -Cardiac diet  -Cardiology consult, appreciate recs  -Continue ASA, hydrochlorothiazide  -Monitor BP and HR     #Hypokalemia  -K 3.4   -Replete per protocol  -Daily BMP    #Vit D deficiency  -Continue cholecalciferol    #Allergies  -Continue fluticasone    DVT ppx  -heparin drip    F: PRN  E: Replete per protocol  N: Cardiac  A: PIV    Disposition: Pt requires less than 2 inpatient days at this time   Code Status: Full Code    Total accumulated time spent face to face and not face to face preparing to see the patient, obtaining and reviewing separately obtained history; performing a medically appropriate examination and/or evaluation; counseling and educating the patient, family; ordering medications, tests, or procedures; referring and communicating with other health care professionals; documenting clinical information in the patient's medical record; independently interpreting results and communicating the results to the patient,  family; and care coordination was 45 minutes.          Taylor Becerra, APRN-CNP

## 2023-12-15 NOTE — DISCHARGE INSTR - OTHER ORDERS
Thank you for choosing [facility] for your Health Care needs.  Also, thank you for allowing us to take you and your families preferences into account when determining your discharge plan.  Stay well!    Have a healthy holiday season!    Your Care Transitions Team Member: Enma Hdz Amanda or Robin [phone]

## 2023-12-15 NOTE — PROGRESS NOTES
TCC spoke with patient regarding discharge planning and home going needs. Patient lives  with her twin sister Kaycee on an [place] farm in a 2 story house.  Their mom lived their prior and she has DME  used with her and also there is a ramp going into the house.  Patient says is independent with ADLs and ambulation and drives.  Confirmed with patient PCP is  Mary Jane Alcantar.  Patient says she has an appt. In early January with her PCP.   Discharge Plan is for patient to return home and her sister will probably pick her up.  TCC will continue to follow for changes in discharge planning needs.

## 2023-12-15 NOTE — CARE PLAN
The patient's goals for the shift include No more chest pain    The clinical goals for the shift include patient will have a stable HR this shift less than 100    Patient with controlled HR today no complaints of   Discomfort VS stable

## 2023-12-15 NOTE — PROGRESS NOTES
Physical Therapy                 Therapy Communication Note    Patient Name: Joy L Pallant  MRN: [id number]  Today's Date: 12/15/2023     Discipline: Physical Therapy    PT flaquitoen/jeimy.  PT order received; chart reviewed. Pt. Is IND with amb. (No AD) and reports no concerns going home. OT observed IND with transfers and amb. No further acute PT needs at this time.     Comment: [id number]

## 2023-12-15 NOTE — CONSULTS
"Nutrition Assessment Note  Nutrition Assessment      Reason for Assessment  Reason for Assessment: Admission nursing screening (MST = 0.)    Per H&P:  Pt presented to ED from home with sister due to palpitations, racing heart.  She was found to be in Afib with ventricular rate in 160s.  She was started on a heparin drip and converted to SNR in ED.  She was admitted to [facility] ICU on 12/15 with new onset Afib with RVR.  Metoprolol started BID.  Echo pending.  Cardiology consulted.  K+ 3.4 (L)- replaced.  Current diet order is Cardiac.    Past Medical History:   Diagnosis Date    Cellulitis of right lower limb 11/07/2019     Cellulitis of leg, right    Hypertension      Malignant neoplasm of tongue, unspecified (CMS/HCC) 11/03/2022     Malignant neoplasm of tongue    Other abnormal glucose 11/17/2013     Abnormal glucose    Other specified health status 02/10/2014     ARB intolerance     Past Surgical History:   Procedure Laterality Date    OTHER SURGICAL HISTORY   01/25/2022     Tongue surgery    OTHER SURGICAL HISTORY   01/25/2022     Cataract surgery    OTHER SURGICAL HISTORY   01/21/2015     Excision, Lesion Floor Mouth    TONSILLECTOMY   04/14/2014     Tonsillectomy With Adenoidectomy     Medications Reviewed.    Pertinent Labs:  12/15/23:  H/H 16.4/51 (H)  Glu 153 (H)  K+ 3.4 (L)    Vital Signs:  /67   Pulse 83   Temp 36 °C (96.8 °F) (Temporal)   Resp 16   Ht 1.676 m (5' 5.98\")   Wt 123 kg (271 lb 4.8 oz)   SpO2 95%   BMI 43.81 kg/m²      History:  Food and Nutrient History  Energy Intake: Good > 75 %  Food and Nutrient History: RDN working remotely from office.  Attempted to contact pt twice today via room phone at 13:25 and 14:40.  No answer.  Unable to obtain subjective data from patient.  Nutrition assessment completed through chart review and discussion with nursing.  Per RN, pt eating 100% of meals, tolerating diet.  No BM documented on flowsheet.                         " "    Anthropometrics:  Height: 167.6 cm (5' 5.98\")  Weight: 123 kg (271 lb 4.8 oz)  BMI (Calculated): 43.81    Weight Change: 0    Weight Change  Weight History / % Weight Change: Weight history per chart:  12/15/23- 123 kg; 7/24/23- 126 kg (2.4% loss x 5 months)  Significant Weight Loss: No         IBW/kg (Dietitian Calculated): 65 kg  Percent of IBW: 189 %     Wt Readings from Last 10 Encounters:   12/15/23 123 kg (271 lb 4.8 oz)   10/09/23 123 kg (271 lb 12.8 oz)   07/24/23 126 kg (278 lb 6.4 oz)   04/27/23 126 kg (277 lb 12.8 oz)   04/18/23 127 kg (280 lb)   02/02/23 123 kg (270 lb 2 oz)   11/03/22 119 kg (262 lb 8 oz)   08/25/22 119 kg (263 lb)   05/12/22 122 kg (270 lb)   02/10/22 125 kg (275 lb 2 oz)                               Energy Needs:  Calculated Energy Needs Using Equations  Height: 167.6 cm (5' 5.98\")  Weight Used for Equation Calculations: 123 kg (271 lb 2.7 oz)  [place]- St. Jeor Equation (Overweight or Obese Patients): 1767  Temp: 36 °C (96.8 °F)    Estimated Energy Needs  Total Energy Estimated Needs (kCal): 1950 kCal  Total Estimated Energy Need per Day (kCal/kg): 30 kCal/kg  Method for Estimating Needs: 30 kcal/kg IBW    Estimated Protein Needs  Total Protein Estimated Needs (g): 130 g  Total Protein Estimated Needs (g/kg): 2 g/kg  Method for Estimating Needs: 2 g/kg IBW    Estimated Fluid Needs  Total Fluid Estimated Needs (mL): 1950 mL  Method for Estimating Needs: 1 mL/kcal or fluid per medical team.         Nutrition Focused Physical Findings:  Subcutaneous Fat Loss  Orbital Fat Pads: Defer (RDN working remotely.)         Edema  Edema: +1 trace  Edema Location: b/l LE per nursing flowsheet.         Physical Findings (Nutrition Deficiency/Toxicity)  Skin: Positive (b/l pre-tibial wounds per nursing flowsheet.)       Nutrition Diagnosis   Malnutrition Diagnosis  Patient has Malnutrition Diagnosis: No    Patient has Nutrition Diagnosis: Yes  Nutrition Diagnosis 1: Increased nutrient " needs  Diagnosis Status (1): New  Related to (1): increased metabolic demand  As Evidenced by (1): pt with b/l pre-tibial wounds increasing nutrient needs for wound healing.                                            Nutrition Interventions/Recommendations   Nutrition Prescription  Individualized Nutrition Prescription Provided for : diet, fluids, oral supplements    Food and/or Nutrient Delivery Interventions  Interventions: Meals and snacks, Medical food supplement    Meals and Snacks: Energy-modified diet, Mineral-modified diet, Protein-modified diet, Fat-modified diet  Goal: Continue Cardiac Diet; Encourage intake of protein foods at each meal.                                Medical Food Supplement: Commercial food  Goal: Prostat BID (200 kcal/30 g protein)                             Additional Interventions: 1.  Monitor lytes and replace as needed;  2.  Check weight 2 to 3 times per week.         Coordination of Nutrition Care by a Nutrition Professional  Collaboration and Referral of Nutrition Care: Collaboration by nutrition professional with other providers  Goal: Pt reviewed at IDT Rounds; BALTAZAR Green    Education Documentation  No documentation found.    -not indicated.       Nutrition Monitoring and Evaluation   Food and Nutrient Related History  Energy Intake: Estimated energy intake  Criteria: Nutritional intake meeting > 75% of estimated needs.    Fluid Intake: Estimated fluid intake  Criteria: Fluid intake meeting estimated needs.    Amount of Food: Medical food intake  Criteria: Pt will consume Prostat BID with good tolerance.                             Anthropometrics: Body Composition/Growth/Weight History  Weight: Measured weight  Criteria: Reduce weight from edema / fluid                   Biochemical Data, Medical Tests and Procedures  Electrolyte and Renal Panel: Potassium  Criteria: K+ wnl                        Nutrition Focused Physical Findings                      Skin: Impaired wound  healing  Criteria: wound healing / skin wnl              Follow Up  Time Spent (min): 40 minutes  Last Date of Nutrition Visit: 12/15/23  Nutrition Follow-Up Needed?: 3-5 days, Dietitian to reassess per policy  Follow up Comment: % meals; PS; needs NFPE.

## 2023-12-16 LAB
ANION GAP SERPL CALC-SCNC: 11 MMOL/L (ref 10–20)
BUN SERPL-MCNC: 19 MG/DL (ref 6–23)
CALCIUM SERPL-MCNC: 8.5 MG/DL (ref 8.6–10.3)
CARDIAC TROPONIN I PNL SERPL HS: 188 NG/L (ref 0–13)
CHLORIDE SERPL-SCNC: 103 MMOL/L (ref 98–107)
CO2 SERPL-SCNC: 26 MMOL/L (ref 21–32)
CREAT SERPL-MCNC: 0.64 MG/DL (ref 0.5–1.05)
ERYTHROCYTE [DISTWIDTH] IN BLOOD BY AUTOMATED COUNT: 14 % (ref 11.5–14.5)
GFR SERPL CREATININE-BSD FRML MDRD: >90 ML/MIN/1.73M*2
GLUCOSE SERPL-MCNC: 112 MG/DL (ref 74–99)
HCT VFR BLD AUTO: 44.3 % (ref 36–46)
HGB BLD-MCNC: 14.2 G/DL (ref 12–16)
MCH RBC QN AUTO: 29.6 PG (ref 26–34)
MCHC RBC AUTO-ENTMCNC: 32.1 G/DL (ref 32–36)
MCV RBC AUTO: 92 FL (ref 80–100)
NRBC BLD-RTO: 0 /100 WBCS (ref 0–0)
PLATELET # BLD AUTO: 301 X10*3/UL (ref 150–450)
POTASSIUM SERPL-SCNC: 3.7 MMOL/L (ref 3.5–5.3)
RBC # BLD AUTO: 4.8 X10*6/UL (ref 4–5.2)
SODIUM SERPL-SCNC: 136 MMOL/L (ref 136–145)
WBC # BLD AUTO: 9.2 X10*3/UL (ref 4.4–11.3)

## 2023-12-16 PROCEDURE — 2500000001 HC RX 250 WO HCPCS SELF ADMINISTERED DRUGS (ALT 637 FOR MEDICARE OP): Mod: IPSPLIT

## 2023-12-16 PROCEDURE — 2500000004 HC RX 250 GENERAL PHARMACY W/ HCPCS (ALT 636 FOR OP/ED): Mod: IPSPLIT

## 2023-12-16 PROCEDURE — 2020000001 HC ICU ROOM DAILY: Mod: IPSPLIT

## 2023-12-16 PROCEDURE — 80048 BASIC METABOLIC PNL TOTAL CA: CPT | Mod: IPSPLIT

## 2023-12-16 PROCEDURE — 85027 COMPLETE CBC AUTOMATED: CPT | Mod: IPSPLIT

## 2023-12-16 PROCEDURE — 36415 COLL VENOUS BLD VENIPUNCTURE: CPT | Mod: IPSPLIT

## 2023-12-16 PROCEDURE — 99233 SBSQ HOSP IP/OBS HIGH 50: CPT | Performed by: NURSE PRACTITIONER

## 2023-12-16 PROCEDURE — 84484 ASSAY OF TROPONIN QUANT: CPT | Mod: IPSPLIT | Performed by: NURSE PRACTITIONER

## 2023-12-16 PROCEDURE — 2500000001 HC RX 250 WO HCPCS SELF ADMINISTERED DRUGS (ALT 637 FOR MEDICARE OP): Mod: IPSPLIT | Performed by: NURSE PRACTITIONER

## 2023-12-16 RX ADMIN — CLOPIDOGREL BISULFATE 75 MG: 75 TABLET ORAL at 08:52

## 2023-12-16 RX ADMIN — HYDROCHLOROTHIAZIDE 25 MG: 25 TABLET ORAL at 08:52

## 2023-12-16 RX ADMIN — PANTOPRAZOLE SODIUM 40 MG: 40 TABLET, DELAYED RELEASE ORAL at 07:10

## 2023-12-16 RX ADMIN — APIXABAN 5 MG: 5 TABLET, FILM COATED ORAL at 22:30

## 2023-12-16 RX ADMIN — APIXABAN 5 MG: 5 TABLET, FILM COATED ORAL at 09:30

## 2023-12-16 RX ADMIN — METOPROLOL TARTRATE 25 MG: 25 TABLET, FILM COATED ORAL at 20:17

## 2023-12-16 RX ADMIN — METOPROLOL TARTRATE 25 MG: 25 TABLET, FILM COATED ORAL at 08:52

## 2023-12-16 RX ADMIN — ATORVASTATIN CALCIUM 80 MG: 40 TABLET, FILM COATED ORAL at 20:17

## 2023-12-16 RX ADMIN — LOSARTAN POTASSIUM 25 MG: 25 TABLET, FILM COATED ORAL at 08:52

## 2023-12-16 RX ADMIN — CHOLECALCIFEROL TAB 125 MCG (5000 UNIT) 5000 UNITS: 125 TAB at 08:52

## 2023-12-16 ASSESSMENT — COGNITIVE AND FUNCTIONAL STATUS - GENERAL
MOBILITY SCORE: 24
DAILY ACTIVITIY SCORE: 24

## 2023-12-16 ASSESSMENT — PAIN SCALES - GENERAL
PAINLEVEL_OUTOF10: 0 - NO PAIN

## 2023-12-16 ASSESSMENT — PAIN - FUNCTIONAL ASSESSMENT
PAIN_FUNCTIONAL_ASSESSMENT: 0-10
PAIN_FUNCTIONAL_ASSESSMENT: 0-10

## 2023-12-16 NOTE — CARE PLAN
The patient's goals for the shift include No more chest pain    The clinical goals for the shift include patient marina have sbp < 160 this shift     Patient out of bed for meals. Sbp<160 this shift.  Remains in nsr. Sister at bedside. Plan of care discussed. Will continue to monitor

## 2023-12-16 NOTE — PROGRESS NOTES
Joy L Pallant is a 68 y.o. female on day 1 of admission presenting with A-fib (CMS/HCC).      Subjective   Patient remains in the ICU  She feels okay  Would like to go home  Cxr stable at time of admission  Discussed eliquis at discharge  TSH 0.61  10/4 lipid panel show 228 cholesterol  Trop pending for am  Had been trending down  19 [phone]   Echo  Wants to see regular heart doctor at discharge - Ronny         Objective     Last Recorded Vitals  /64 (BP Location: Left arm, Patient Position: Lying)   Pulse 63   Temp 36.1 °C (97 °F) (Temporal)   Resp 23   Wt 126 kg (278 lb 10.6 oz)   SpO2 96%   Intake/Output last 3 Shifts:    Intake/Output Summary (Last 24 hours) at 12/16/2023 1022  Last data filed at 12/16/2023 0900  Gross per 24 hour   Intake 975.33 ml   Output --   Net 975.33 ml       Admission Weight  Weight: 129 kg (283 lb 8.2 oz) (12/15/23 0305)    Daily Weight  12/16/23 : 126 kg (278 lb 10.6 oz)    Image Results  Transthoracic Echo (TTE) Complete     [facility], [address] [place] [address]               Tel [phone] and Fax [phone]    TRANSTHORACIC ECHOCARDIOGRAM REPORT       Patient Name:      JOY L PALLANT        Reading Physician:    [id number] Tato Murphy MD  Study Date:        12/15/2023           Ordering Provider:    [id number] MAR  MRN/PID:           [id number]             Fellow:  Accession#:        [id number]         Nurse:                Karla Delatorre RN  Date of Birth/Age: 1955 / 68 years Sonographer:          Lizzie Pérez RDCS  Gender:            F                    Additional Staff:  Height:            167.64 cm            Admit Date:  Weight:            122.93 kg            Admission Status:     Inpatient -                                                                 Routine  BSA:               2.28 m2              Encounter#:           [id number]                                          Department Location:  [place] [facility]  Blood Pressure: 137 /67 mmHg    Study Type:    TRANSTHORACIC ECHO (TTE) COMPLETE  Diagnosis/ICD: Unspecified atrial fibrillation-I48.91  Indication:    Atrial Fibrillation  CPT Code:      Echo Complete w Full Doppler-[id number]    Patient History:  Pertinent History: A-Fib and HTN.    Study Detail: The following Echo studies were performed: 2D, M-Mode, Doppler and                color flow. Technically challenging study due to poor acoustic                windows and body habitus. Definity used as a contrast agent for                endocardial border definition. Total contrast used for this                procedure was 2 mL via IV push. The patient was awake.       PHYSICIAN INTERPRETATION:  Left Ventricle: The left ventricular systolic function is normal, with an estimated ejection fraction of 55-60%. There are no regional wall motion abnormalities. The left ventricular cavity size is normal. Spectral Doppler shows an impaired relaxation pattern of left ventricular diastolic filling.  Left Atrium: The left atrium is mildly dilated.  Right Ventricle: The right ventricle is normal in size. There is normal right ventricular global systolic function.  Right Atrium: The right atrium is normal in size.  Aortic Valve: The aortic valve is trileaflet. There is mild to moderate aortic valve cusp calcification. There is mild to moderate aortic valve thickening. There is evidence of mildly elevated transaortic gradients consistent with sclerosis of the aortic valve.  There is trace to mild aortic valve regurgitation. The peak instantaneous gradient of the aortic valve is 14.6 mmHg. The mean gradient of the aortic valve is 6.0 mmHg.  Mitral Valve: The mitral valve is normal in structure. There is mild mitral annular calcification. There is no  evidence of mitral valve regurgitation.  Tricuspid Valve: The tricuspid valve is structurally normal. There is mild tricuspid regurgitation.  Pulmonic Valve: The pulmonic valve is not well visualized. There is physiologic pulmonic valve regurgitation.  Pericardium: There is no pericardial effusion noted.  Aorta: The aortic root was not well visualized.       CONCLUSIONS:   1. Left ventricular systolic function is normal with a 55-60% estimated ejection fraction.   2. Spectral Doppler shows an impaired relaxation pattern of left ventricular diastolic filling.   3. Aortic valve sclerosis.    QUANTITATIVE DATA SUMMARY:  2D MEASUREMENTS:                           Normal Ranges:  Ao Root d:     3.10 cm   (2.0-3.7cm)  LAs:           2.00 cm   (2.7-4.0cm)  IVSd:          1.61 cm   (0.6-1.1cm)  LVPWd:         1.03 cm   (0.6-1.1cm)  LVIDd:         4.19 cm   (3.9-5.9cm)  LVIDs:         2.82 cm  LV Mass Index: 89.9 g/m2  LV % FS        32.7 %    LA VOLUME:                                Normal Ranges:  LA Vol A4C:        107.1 ml   (22+/-6mL/m2)  LA Vol A2C:        71.3 ml  LA Vol BP:         88.8 ml  LA Vol Index A4C:  47.1ml/m2  LA Vol Index A2C:  31.3 ml/m2  LA Vol Index BP:   39.0 ml/m2  LA Area A4C:       27.5 cm2  LA Area A2C:       22.8 cm2  LA Major Axis A4C: 6.0 cm  LA Major Axis A2C: 6.2 cm  LA Volume Index:   37.0 ml/m2    RA VOLUME BY A/L METHOD:                                Normal Ranges:  RA Vol A4C:        45.9 ml    (8.3-19.5ml)  RA Vol Index A4C:  20.2 ml/m2  RA Area A4C:       16.1 cm2  RA Major Axis A4C: 4.8 cm    M-MODE MEASUREMENTS:                   Normal Ranges:  Ao Root: 2.90 cm (2.0-3.7cm)  LAs:     3.50 cm (2.7-4.0cm)    AORTA MEASUREMENTS:                     Normal Ranges:  Asc Ao, d: 3.10 cm (2.1-3.4cm)    LV SYSTOLIC FUNCTION BY 2D PLANIMETRY (MOD):                      Normal Ranges:  EF-A4C View: 60.2 % (>=55%)  EF-A2C View: 56.5 %  EF-Biplane:  55.7 %    LV DIASTOLIC FUNCTION:                                 Normal Ranges:  MV Peak E:        0.88 m/s    (0.7-1.2 m/s)  MV Peak A:        1.10 m/s    (0.42-0.7 m/s)  E/A Ratio:        0.80        (1.0-2.2)  MV e'             0.08 m/s    (>8.0)  MV lateral e'     0.07 m/s  MV medial e'      0.08 m/s  MV A Dur:         137.00 msec  E/e' Ratio:       11.76       (<8.0)  PulmV Sys Osorio:    40.80 cm/s  PulmV Calderon Osorio:   25.80 cm/s  PulmV S/D Osorio:    1.60  PulmV A Revs Osorio: 34.30 cm/s  PulmV A Revs Dur: 156.00 msec    MITRAL VALVE:                  Normal Ranges:  MV DT: 201 msec (150-240msec)    AORTIC VALVE:                                     Normal Ranges:  AoV Vmax:                1.91 m/s  (<=1.7m/s)  AoV Peak P.6 mmHg (<20mmHg)  AoV Mean P.0 mmHg  (1.7-11.5mmHg)  LVOT Max Osorio:            1.09 m/s  (<=1.1m/s)  AoV VTI:                 35.90 cm  (18-25cm)  LVOT VTI:                26.90 cm  LVOT Diameter:           2.00 cm   (1.8-2.4cm)  AoV Area, VTI:           2.35 cm2  (2.5-5.5cm2)  AoV Area,Vmax:           1.79 cm2  (2.5-4.5cm2)  AoV Dimensionless Index: 0.75       RIGHT VENTRICLE:  RV Basal 3.80 cm  RV Mid   3.30 cm  RV Major 6.8 cm  TAPSE:   17.5 mm  RV s'    0.15 m/s    TRICUSPID VALVE/RVSP:                              Normal Ranges:  Peak TR Velocity: 2.67 m/s  RV Syst Pressure: 31.5 mmHg (< 30mmHg)  IVC Diam:         1.80 cm    PULMONIC VALVE:                       Normal Ranges:  PV Max Osorio: 0.9 m/s  (0.6-0.9m/s)  PV Max PG:  3.2 mmHg    Pulmonary Veins:  PulmV A Revs Dur: 156.00 msec  PulmV A Revs Osorio: 34.30 cm/s  PulmV Calderon Osorio:   25.80 cm/s  PulmV S/D Osorio:    1.60  PulmV Sys Osorio:    40.80 cm/s       [id number] Tato Murphy MD  Electronically signed on 12/15/2023 at 8:06:28 PM       ** Final **  XR chest 1 view  Narrative: Interpreted By:  Keo Reddy,   STUDY:  XR CHEST 1 VIEW;  12/15/2023 3:47 am      INDICATION:  Signs/Symptoms:chest pain.      COMPARISON:  10/31/2023      ACCESSION  NUMBER(S):  [id number]      ORDERING CLINICIAN:  JEANA RICHARDSON      FINDINGS:  There is magnification of the cardiac silhouette secondary to AP  technique. No airspace consolidation or pleural effusion. No  pneumothorax. Degenerative change of bilateral acromioclavicular  joints.      Impression: No airspace consolidation or pleural effusion.      MACRO:  None      Signed by: Keo Luciocher 12/15/2023 3:51 AM  Dictation workstation:   [id number]      Physical Exam  Constitutional:       Appearance: Normal appearance. She is normal weight.   HENT:      Head: Normocephalic and atraumatic.      Right Ear: Tympanic membrane normal.      Left Ear: Tympanic membrane normal.      Nose: Nose normal.      Mouth/Throat:      Mouth: Mucous membranes are moist.   Eyes:      Extraocular Movements: Extraocular movements intact.      Conjunctiva/sclera: Conjunctivae normal.      Pupils: Pupils are equal, round, and reactive to light.   Cardiovascular:      Rate and Rhythm: Normal rate. Rhythm irregular.      Heart sounds: Murmur heard.   Pulmonary:      Effort: Pulmonary effort is normal.      Breath sounds: Normal breath sounds.   Abdominal:      General: Bowel sounds are normal.      Palpations: Abdomen is soft.   Musculoskeletal:         General: Normal range of motion.      Cervical back: Normal range of motion and neck supple.   Skin:     General: Skin is warm and dry.      Capillary Refill: Capillary refill takes less than 2 seconds.   Neurological:      General: No focal deficit present.      Mental Status: She is alert and oriented to person, place, and time. Mental status is at baseline.   Psychiatric:         Mood and Affect: Mood normal.         Behavior: Behavior normal.         Thought Content: Thought content normal.         Judgment: Judgment normal.         Relevant Results  Scheduled medications  apixaban, 5 mg, oral, q12h  atorvastatin, 80 mg, oral, Nightly  cholecalciferol, 5,000 Units, oral,  Daily  clopidogrel, 75 mg, oral, Daily  hydroCHLOROthiazide, 25 mg, oral, Daily  losartan, 25 mg, oral, Daily  metoprolol tartrate, 25 mg, oral, BID  pantoprazole, 40 mg, oral, Daily before breakfast   Or  pantoprazole, 40 mg, intravenous, Daily before breakfast  perflutren protein A microsphere, 0.5 mL, intravenous, Once in imaging  sulfur hexafluoride microsphr, 2 mL, intravenous, Once in imaging      Continuous medications  [Held by provider] dilTIAZem, 5-15 mg/hr, Last Rate: 10 mg/hr (12/15/23 2208)      PRN medications  PRN medications: acetaminophen **OR** acetaminophen **OR** acetaminophen, acetaminophen **OR** acetaminophen **OR** acetaminophen, melatonin, ondansetron **OR** ondansetron, polyethylene glycol           Assessment/Plan   This patient currently has cardiac telemetry ordered; if you would like to modify or discontinue the telemetry order, click here to go to the orders activity to modify/discontinue the order.      continue ICU  Echocardiogram - assess LVEF and structural heart  Elevated HS troponin without ST changes or symptoms  Check trop this am and then d/c  High intensity statin- Atorvastatin 80mg daily  OP evaluation r/o inducible ischemia with elective stress testing  Losartan 25mg daily for cardio, renal protection of borderline DM  Atrial fibrillation management as follows:  -Apixaban 5mg BID  -Metoprolol 25mg BID  -OP PHILLIP testing          Principal Problem:    A-fib (CMS/[facility])                  Alisha Garcia, APRN-CNP    Total accumulated time spent face to face and not face to face preparing to see the patient, obtaining and reviewing separately obtained history; performing a medically appropriate examination and/or evaluation; counseling and educating the patient, family; ordering medications, tests, or procedures; referring and communicating with other health care professionals; documenting clinical information in the patient's medical record; independently interpreting results  and communicating the results to the patient, family; and care coordination was 45 minutes.

## 2023-12-16 NOTE — PROGRESS NOTES
"    CRITICAL CARE PROGRESS NOTE      Hospital Day # 1    Joy L Pallant  Primary Care Physician: Mary Jane Alcantar MD  Primary Pulmonologist:      Subjective/Last 24 Hour Update   Patient had no acute events during the day. Vitals are stable.    Patient is off cardizem and heparin. Patient  afib's was treated with metoprolol and diltiazem. Patient did convert during day shift.     Lines-  PIVS     Gtt- no gtts       Hospital Course     Joy L Pallant is a 68 y.o. female presenting with complaints of palpitations. Pt states that about 2am she woke up out of a sleep because she could feel her heart racing. She attempted to get her blood pressure with a home cuff but kept getting \"error\". She called the ambulance and they told her to take 4 baby ASA in addition to her daily baby ASA. She denies any chest pain but did feel some heaviness when she could feel her heart racing. Pt denies any shortness of breath or other associated symptoms. Pt was found to be in a-fib with ventricular rate in 160s. She was started on IV diltiazem and IV heparin. Pt converted to NSR in ED. Was admitted to ICU for medical management.     ED VS: T36.6, , RR 20, /86, Sp02 93%RA     Imaging: CXR- No airspace consolidation or pleural effusion.      Labs: Glu 153, Na 138, K 3.4, Bun/creat 22/0.88, BNP 41, Trop 15, WBC 9.8, H/H 16.4/51.0, Plt 320        Past history: reviewed as below    Past Medical History:   Diagnosis Date    Cellulitis of right lower limb 11/07/2019    Cellulitis of leg, right    Hypertension     Malignant neoplasm of tongue, unspecified (CMS/[facility]) 11/03/2022    Malignant neoplasm of tongue    Other abnormal glucose 11/17/2013    Abnormal glucose    Other specified health status 02/10/2014    ARB intolerance     Past Surgical History:   Procedure Laterality Date    OTHER SURGICAL HISTORY  01/25/2022    Tongue surgery    OTHER SURGICAL HISTORY  01/25/2022    Cataract surgery    OTHER SURGICAL HISTORY  01/21/2015    " Excision, Lesion Floor Mouth    TONSILLECTOMY  04/14/2014    Tonsillectomy With Adenoidectomy     Social History     Socioeconomic History    Marital status: Single     Spouse name: None    Number of children: None    Years of education: None    Highest education level: None   Occupational History    None   Tobacco Use    Smoking status: Never    Smokeless tobacco: Never   Vaping Use    Vaping Use: Never used   Substance and Sexual Activity    Alcohol use: Never    Drug use: Never    Sexual activity: None   Other Topics Concern    None   Social History Narrative    None     Social Determinants of Health     Financial Resource Strain: Low Risk  (12/15/2023)    Overall Financial Resource Strain (CARDIA)     Difficulty of Paying Living Expenses: Not hard at all   Food Insecurity: Not on file   Transportation Needs: No Transportation Needs (12/15/2023)    PRAPARE - Transportation     Lack of Transportation (Medical): No     Lack of Transportation (Non-Medical): No   Physical Activity: Not on file   Stress: Not on file   Social Connections: Unknown (12/15/2023)    Social Connection and Isolation Panel [NHANES]     Frequency of Communication with Friends and Family: Not on file     Frequency of Social Gatherings with Friends and Family: Not on file     Attends [employer] Services: Not on file     Active Member of Clubs or Organizations: Not on file     Attends Club or Organization Meetings: Not on file     Marital Status: Never    Intimate Partner Violence: Not on file   Housing Stability: Low Risk  (12/15/2023)    Housing Stability Vital Sign     Unable to Pay for Housing in the Last Year: No     Number of Places Lived in the Last Year: 1     Unstable Housing in the Last Year: No     Family History   Problem Relation Name Age of Onset    Heart attack Mother      Atrial fibrillation Mother      Atrial fibrillation Sister             Objective         Vitals for last 24 hours Temperature Ins/Outs Weight   Temp:   [35.7 °C (96.3 °F)-36.6 °C (97.9 °F)] 35.7 °C (96.3 °F)  Heart Rate:  [] 87  Resp:  [14-35] 35  BP: (136-192)/(67-99) 172/81 Temp (24hrs), Av.1 °C (97 °F), Min:35.7 °C (96.3 °F), Max:36.6 °C (97.9 °F)     Intake/Output Summary (Last 24 hours) at 2023 0052  Last data filed at 12/15/2023 2208  Gross per 24 hour   Intake 925 ml   Output --   Net 925 ml    Wt Readings from Last 7 Encounters:   12/15/23 123 kg (271 lb 4.8 oz)   10/09/23 123 kg (271 lb 12.8 oz)   23 126 kg (278 lb 6.4 oz)   23 126 kg (277 lb 12.8 oz)   23 127 kg (280 lb)   23 123 kg (270 lb 2 oz)   22 119 kg (262 lb 8 oz)    Body mass index is 43.81 kg/m².   Sats Hemodynamics Cumulative I/O Vent Settings   SpO2 Readings from Last 3 Encounters:   12/15/23 95%   10/09/23 96%   23 96%          [unfilled]   @[facility]@     Exam:    Gen: NAD.   HEENT: NCAT  CV: irregularly irregular   Resp: Bilateral chest excursion  Abd: S, NT, ND  Ext: WWP, no significant peripheral edema noted.  Neuro: No focal deficits identified.      Drains         Medications     Antibiotics:  Name Indication Start Date Stop Date         NA                                    Completed Antibiotics:                                                    Scheduled Meds:apixaban, 5 mg, oral, q12h  atorvastatin, 80 mg, oral, Nightly  cholecalciferol, 5,000 Units, oral, Daily  clopidogrel, 75 mg, oral, Daily  fluticasone, 1 spray, Each Nostril, Daily  hydroCHLOROthiazide, 25 mg, oral, Daily  losartan, 25 mg, oral, Daily  metoprolol tartrate, 25 mg, oral, BID  pantoprazole, 40 mg, oral, Daily before breakfast   Or  pantoprazole, 40 mg, intravenous, Daily before breakfast      Continuous Infusions:[Held by provider] dilTIAZem, 5-15 mg/hr, Last Rate: 10 mg/hr (12/15/23 2208)          Laboratory Data     ID/Cultures:    Date Result Date  Result   Blood NA      Respiratory       Urine       C. Diff       Flu/RSV/COVID       Other    No  "results found for: \"HIV1X2\"  No results found for: \"KM3GFGUS\"       Hematology  Results from last 7 days   Lab Units 12/15/23  0305   WBC AUTO x10*3/uL 9.8   RBC AUTO x10*6/uL 5.61*   HEMOGLOBIN g/dL 16.4*   HEMATOCRIT % 51.0*   PLATELETS AUTO x10*3/uL 320     Results from last 7 days   Lab Units 12/15/23  0305   INR  1.0     Chemistry       Results from last 7 days   Lab Units 12/15/23  0305   BNP pg/mL 41     Results from last 7 days   Lab Units 12/15/23  0305   SODIUM mmol/L 138   POTASSIUM mmol/L 3.4*   CHLORIDE mmol/L 100   CO2 mmol/L 29   BUN mg/dL 22   CREATININE mg/dL 0.88   CALCIUM mg/dL 9.4   ALBUMIN g/dL 4.1   PROTEIN TOTAL g/dL 7.7   BILIRUBIN TOTAL mg/dL 0.4   ALT U/L 20   AST U/L 19         No lab exists for component: \"SCVO2\"      Blood Gases        No lab exists for component: \"[id number]\", \"AE1OKHFC\", \"BEDEFICIT\"       Assessment/Plan     Atrial Fibrillation   Hypertension   History of tongue cancer   Polycythemia vera   Hypokalemia         - patient is on lopressor 25 mg po BID   - cont with statin    - polycythemia vera needs evaluation as an outpatien   - Will continue to manage and replete critical electrolytes. Will keep  Mg >2.0, K >4.0, and Phos greater than 3.0.  - Will actively target with vasopressors , if needed, to keep MAP > 65.  - Appropriately maintain saturations above 92%.  - Continue to monitor UOP carefully to ensure at least 0.5ml/kg/h.  - Maintain appropriate sleep/wake cycles to avoid ICU delirium.    DVT prophylaxis: eliquis   GI prophylaxis:protonix   Code status: Full        The entirety of this visit history, physical exam, and diagnostic interpretation was done via audiovisual telemedicine.     Patient is located in [place]  and I am located in [place].   Upon my own and separate evaluation, this patient had a high  probability of imminent of life-threatening deterioration due to atrial fibrillation ,  which required my direct attention, intervention and " personal  management.    I have personally provided 45 minutes of critical care time exclusive  of time spent on separately billable procedures. Time includes review  of laboratory data, radiology results, discussion with consultants,  and monitoring of potential decompensation. Interventions were  performed as documented above.    Mukul Davis IV, MD  Pulmonary, Critical Care and Sleep Medicine  [facility]

## 2023-12-16 NOTE — CARE PLAN
The patient's goals for the shift include No more chest pain    The clinical goals for the shift include Maintain patient safety    Over the shift, the patient did not make progress toward the following goals. Barriers to progression include none. Recommendations to address these barriers include n/a.

## 2023-12-17 VITALS
RESPIRATION RATE: 16 BRPM | HEART RATE: 78 BPM | SYSTOLIC BLOOD PRESSURE: 148 MMHG | HEIGHT: 66 IN | OXYGEN SATURATION: 95 % | WEIGHT: 270.28 LBS | TEMPERATURE: 97.5 F | BODY MASS INDEX: 43.44 KG/M2 | DIASTOLIC BLOOD PRESSURE: 81 MMHG

## 2023-12-17 LAB
ALBUMIN SERPL BCP-MCNC: 3.7 G/DL (ref 3.4–5)
ALP SERPL-CCNC: 65 U/L (ref 33–136)
ALT SERPL W P-5'-P-CCNC: 14 U/L (ref 7–45)
ANION GAP SERPL CALC-SCNC: 13 MMOL/L (ref 10–20)
AST SERPL W P-5'-P-CCNC: 15 U/L (ref 9–39)
BILIRUB SERPL-MCNC: 0.6 MG/DL (ref 0–1.2)
BUN SERPL-MCNC: 18 MG/DL (ref 6–23)
CALCIUM SERPL-MCNC: 8.9 MG/DL (ref 8.6–10.3)
CHLORIDE SERPL-SCNC: 103 MMOL/L (ref 98–107)
CO2 SERPL-SCNC: 26 MMOL/L (ref 21–32)
CREAT SERPL-MCNC: 0.78 MG/DL (ref 0.5–1.05)
ERYTHROCYTE [DISTWIDTH] IN BLOOD BY AUTOMATED COUNT: 14.1 % (ref 11.5–14.5)
GFR SERPL CREATININE-BSD FRML MDRD: 82 ML/MIN/1.73M*2
GLUCOSE SERPL-MCNC: 110 MG/DL (ref 74–99)
HCT VFR BLD AUTO: 49.6 % (ref 36–46)
HGB BLD-MCNC: 15.3 G/DL (ref 12–16)
MAGNESIUM SERPL-MCNC: 2.05 MG/DL (ref 1.6–2.4)
MCH RBC QN AUTO: 29.6 PG (ref 26–34)
MCHC RBC AUTO-ENTMCNC: 30.8 G/DL (ref 32–36)
MCV RBC AUTO: 96 FL (ref 80–100)
NRBC BLD-RTO: 0 /100 WBCS (ref 0–0)
PLATELET # BLD AUTO: 314 X10*3/UL (ref 150–450)
POTASSIUM SERPL-SCNC: 4 MMOL/L (ref 3.5–5.3)
PROT SERPL-MCNC: 6.7 G/DL (ref 6.4–8.2)
RBC # BLD AUTO: 5.17 X10*6/UL (ref 4–5.2)
SODIUM SERPL-SCNC: 138 MMOL/L (ref 136–145)
WBC # BLD AUTO: 10.4 X10*3/UL (ref 4.4–11.3)

## 2023-12-17 PROCEDURE — 83735 ASSAY OF MAGNESIUM: CPT | Mod: IPSPLIT | Performed by: NURSE PRACTITIONER

## 2023-12-17 PROCEDURE — 2500000004 HC RX 250 GENERAL PHARMACY W/ HCPCS (ALT 636 FOR OP/ED): Mod: IPSPLIT

## 2023-12-17 PROCEDURE — 85027 COMPLETE CBC AUTOMATED: CPT | Mod: IPSPLIT | Performed by: NURSE PRACTITIONER

## 2023-12-17 PROCEDURE — 2500000001 HC RX 250 WO HCPCS SELF ADMINISTERED DRUGS (ALT 637 FOR MEDICARE OP): Mod: IPSPLIT

## 2023-12-17 PROCEDURE — 80053 COMPREHEN METABOLIC PANEL: CPT | Mod: IPSPLIT | Performed by: NURSE PRACTITIONER

## 2023-12-17 PROCEDURE — 36415 COLL VENOUS BLD VENIPUNCTURE: CPT | Mod: IPSPLIT | Performed by: NURSE PRACTITIONER

## 2023-12-17 PROCEDURE — 2500000001 HC RX 250 WO HCPCS SELF ADMINISTERED DRUGS (ALT 637 FOR MEDICARE OP): Mod: IPSPLIT | Performed by: NURSE PRACTITIONER

## 2023-12-17 PROCEDURE — 99231 SBSQ HOSP IP/OBS SF/LOW 25: CPT | Performed by: NURSE PRACTITIONER

## 2023-12-17 RX ORDER — METOPROLOL TARTRATE 25 MG/1
25 TABLET, FILM COATED ORAL 2 TIMES DAILY
Qty: 60 TABLET | Refills: 0 | Status: SHIPPED | OUTPATIENT
Start: 2023-12-17 | End: 2024-01-29 | Stop reason: SDUPTHER

## 2023-12-17 RX ORDER — ATORVASTATIN CALCIUM 40 MG/1
40 TABLET, FILM COATED ORAL DAILY
Qty: 30 TABLET | Refills: 0 | Status: SHIPPED | OUTPATIENT
Start: 2023-12-17 | End: 2024-01-29 | Stop reason: SDUPTHER

## 2023-12-17 RX ORDER — CLOPIDOGREL BISULFATE 75 MG/1
75 TABLET ORAL DAILY
Qty: 30 TABLET | Refills: 0 | Status: SHIPPED | OUTPATIENT
Start: 2023-12-18 | End: 2023-12-17 | Stop reason: SDUPTHER

## 2023-12-17 RX ORDER — ATORVASTATIN CALCIUM 80 MG/1
40 TABLET, FILM COATED ORAL NIGHTLY
Qty: 15 TABLET | Refills: 0 | Status: SHIPPED | OUTPATIENT
Start: 2023-12-17 | End: 2023-12-17 | Stop reason: HOSPADM

## 2023-12-17 RX ORDER — CLOPIDOGREL BISULFATE 75 MG/1
75 TABLET ORAL DAILY
Qty: 30 TABLET | Refills: 0 | Status: SHIPPED | OUTPATIENT
Start: 2023-12-18 | End: 2023-12-28 | Stop reason: ALTCHOICE

## 2023-12-17 RX ORDER — FLECAINIDE ACETATE 50 MG/1
TABLET ORAL
Qty: 32 TABLET | Refills: 0 | Status: SHIPPED | OUTPATIENT
Start: 2023-12-17 | End: 2023-12-17 | Stop reason: SDUPTHER

## 2023-12-17 RX ORDER — METOPROLOL TARTRATE 25 MG/1
25 TABLET, FILM COATED ORAL 2 TIMES DAILY
Qty: 60 TABLET | Refills: 0 | Status: SHIPPED | OUTPATIENT
Start: 2023-12-17 | End: 2023-12-17 | Stop reason: SDUPTHER

## 2023-12-17 RX ORDER — LOSARTAN POTASSIUM AND HYDROCHLOROTHIAZIDE 12.5; 5 MG/1; MG/1
1 TABLET ORAL DAILY
Qty: 30 TABLET | Refills: 0 | Status: SHIPPED | OUTPATIENT
Start: 2023-12-17 | End: 2024-01-29 | Stop reason: ALTCHOICE

## 2023-12-17 RX ORDER — LOSARTAN POTASSIUM AND HYDROCHLOROTHIAZIDE 12.5; 5 MG/1; MG/1
1 TABLET ORAL DAILY
Qty: 30 TABLET | Refills: 0 | Status: SHIPPED | OUTPATIENT
Start: 2023-12-17 | End: 2023-12-17 | Stop reason: SDUPTHER

## 2023-12-17 RX ORDER — FLECAINIDE ACETATE 50 MG/1
TABLET ORAL
Qty: 32 TABLET | Refills: 0 | Status: SHIPPED | OUTPATIENT
Start: 2023-12-17

## 2023-12-17 RX ADMIN — APIXABAN 5 MG: 5 TABLET, FILM COATED ORAL at 09:40

## 2023-12-17 RX ADMIN — METOPROLOL TARTRATE 25 MG: 25 TABLET, FILM COATED ORAL at 08:31

## 2023-12-17 RX ADMIN — PANTOPRAZOLE SODIUM 40 MG: 40 TABLET, DELAYED RELEASE ORAL at 07:00

## 2023-12-17 RX ADMIN — CLOPIDOGREL BISULFATE 75 MG: 75 TABLET ORAL at 08:31

## 2023-12-17 RX ADMIN — HYDROCHLOROTHIAZIDE 25 MG: 25 TABLET ORAL at 08:31

## 2023-12-17 RX ADMIN — LOSARTAN POTASSIUM 25 MG: 25 TABLET, FILM COATED ORAL at 08:31

## 2023-12-17 RX ADMIN — CHOLECALCIFEROL TAB 125 MCG (5000 UNIT) 5000 UNITS: 125 TAB at 08:31

## 2023-12-17 ASSESSMENT — COGNITIVE AND FUNCTIONAL STATUS - GENERAL
MOBILITY SCORE: 24
DAILY ACTIVITIY SCORE: 24

## 2023-12-17 ASSESSMENT — PAIN SCALES - GENERAL
PAINLEVEL_OUTOF10: 0 - NO PAIN
PAINLEVEL_OUTOF10: 0 - NO PAIN

## 2023-12-17 ASSESSMENT — PAIN - FUNCTIONAL ASSESSMENT: PAIN_FUNCTIONAL_ASSESSMENT: 0-10

## 2023-12-17 NOTE — PROGRESS NOTES
"    CRITICAL CARE PROGRESS NOTE      Hospital Day # 2    Joy L Pallant  Primary Care Physician: Mary Jane Alcantar MD  Primary Pulmonologist:      Subjective/Last 24 Hour Update   Patient had no acute events during the day. Vitals are stable     Patient is pending cardiology evaluation.       Hospital Course     Joy L Pallant is a 68 y.o. female presenting with complaints of palpitations. Pt states that about 2am she woke up out of a sleep because she could feel her heart racing. She attempted to get her blood pressure with a home cuff but kept getting \"error\". She called the ambulance and they told her to take 4 baby ASA in addition to her daily baby ASA. She denies any chest pain but did feel some heaviness when she could feel her heart racing. Pt denies any shortness of breath or other associated symptoms. Pt was found to be in a-fib with ventricular rate in 160s. She was started on IV diltiazem and IV heparin. Pt converted to NSR in ED. Was admitted to ICU for medical management.     ED VS: T36.6, , RR 20, /86, Sp02 93%RA     Imaging: CXR- No airspace consolidation or pleural effusion.      Labs: Glu 153, Na 138, K 3.4, Bun/creat 22/0.88, BNP 41, Trop 15, WBC 9.8, H/H 16.4/51.0, Plt 320    12/16 - converted during day shift on metoprolol         Past history: reviewed as below    Past Medical History:   Diagnosis Date    Cellulitis of right lower limb 11/07/2019    Cellulitis of leg, right    Hypertension     Malignant neoplasm of tongue, unspecified (CMS/HCC) 11/03/2022    Malignant neoplasm of tongue    Other abnormal glucose 11/17/2013    Abnormal glucose    Other specified health status 02/10/2014    ARB intolerance     Past Surgical History:   Procedure Laterality Date    OTHER SURGICAL HISTORY  01/25/2022    Tongue surgery    OTHER SURGICAL HISTORY  01/25/2022    Cataract surgery    OTHER SURGICAL HISTORY  01/21/2015    Excision, Lesion Floor Mouth    TONSILLECTOMY  04/14/2014    " Tonsillectomy With Adenoidectomy     Social History     Socioeconomic History    Marital status: Single     Spouse name: None    Number of children: None    Years of education: None    Highest education level: None   Occupational History    None   Tobacco Use    Smoking status: Never    Smokeless tobacco: Never   Vaping Use    Vaping Use: Never used   Substance and Sexual Activity    Alcohol use: Never    Drug use: Never    Sexual activity: None   Other Topics Concern    None   Social History Narrative    None     Social Determinants of Health     Financial Resource Strain: Low Risk  (12/15/2023)    Overall Financial Resource Strain (CARDIA)     Difficulty of Paying Living Expenses: Not hard at all   Food Insecurity: Not on file   Transportation Needs: No Transportation Needs (12/15/2023)    PRAPARE - Transportation     Lack of Transportation (Medical): No     Lack of Transportation (Non-Medical): No   Physical Activity: Not on file   Stress: Not on file   Social Connections: Unknown (12/15/2023)    Social Connection and Isolation Panel [NHANES]     Frequency of Communication with Friends and Family: Not on file     Frequency of Social Gatherings with Friends and Family: Not on file     Attends [employer] Services: Not on file     Active Member of Clubs or Organizations: Not on file     Attends Club or Organization Meetings: Not on file     Marital Status: Never    Intimate Partner Violence: Not on file   Housing Stability: Low Risk  (12/15/2023)    Housing Stability Vital Sign     Unable to Pay for Housing in the Last Year: No     Number of Places Lived in the Last Year: 1     Unstable Housing in the Last Year: No     Family History   Problem Relation Name Age of Onset    Heart attack Mother      Atrial fibrillation Mother      Atrial fibrillation Sister             Objective         Vitals for last 24 hours Temperature Ins/Outs Weight   Temp:  [35.6 °C (96.1 °F)-36.6 °C (97.9 °F)] 35.9 °C (96.6 °F)  Heart  Rate:  [57-96] 75  Resp:  [6-46] 23  BP: (106-150)/(54-78) 141/65  FiO2 (%):  [21 %] 21 % Temp (24hrs), Av.2 °C (97.1 °F), Min:35.6 °C (96.1 °F), Max:36.6 °C (97.9 °F)     Intake/Output Summary (Last 24 hours) at 2023 0123  Last data filed at 2023 1700  Gross per 24 hour   Intake 1080 ml   Output --   Net 1080 ml    Wt Readings from Last 7 Encounters:   23 126 kg (278 lb 10.6 oz)   10/09/23 123 kg (271 lb 12.8 oz)   23 126 kg (278 lb 6.4 oz)   23 126 kg (277 lb 12.8 oz)   23 127 kg (280 lb)   23 123 kg (270 lb 2 oz)   22 119 kg (262 lb 8 oz)    Body mass index is 45 kg/m².   Sats Hemodynamics Cumulative I/O Vent Settings   SpO2 Readings from Last 3 Encounters:   23 93%   10/09/23 96%   23 96%          [unfilled]   @[facility]@     Exam:    Gen: NAD.   HEENT: NCAT  CV:  NSR   Resp: Bilateral chest excursion  Abd: S, NT, ND  Ext: WWP, no significant peripheral edema noted.  Neuro: No focal deficits identified.         Drains         Medications     Antibiotics:  Name Indication Start Date Stop Date         NA                                    Completed Antibiotics:                                                    Scheduled Meds:apixaban, 5 mg, oral, q12h  atorvastatin, 80 mg, oral, Nightly  cholecalciferol, 5,000 Units, oral, Daily  clopidogrel, 75 mg, oral, Daily  hydroCHLOROthiazide, 25 mg, oral, Daily  losartan, 25 mg, oral, Daily  metoprolol tartrate, 25 mg, oral, BID  pantoprazole, 40 mg, oral, Daily before breakfast   Or  pantoprazole, 40 mg, intravenous, Daily before breakfast  perflutren protein A microsphere, 0.5 mL, intravenous, Once in imaging  sulfur hexafluoride microsphr, 2 mL, intravenous, Once in imaging      Continuous Infusions:[Held by provider] dilTIAZem, 5-15 mg/hr, Last Rate: 10 mg/hr (12/15/23 2208)          Laboratory Data     ID/Cultures:    Date Result Date  Result   Blood NA      Respiratory       Urine       C.  "Diff       Flu/RSV/COVID       Other    No results found for: \"HIV1X2\"  No results found for: \"RF4NZWSH\"       Hematology  Results from last 7 days   Lab Units 12/16/23  0452 12/15/23  0305   WBC AUTO x10*3/uL 9.2 9.8   RBC AUTO x10*6/uL 4.80 5.61*   HEMOGLOBIN g/dL 14.2 16.4*   HEMATOCRIT % 44.3 51.0*   PLATELETS AUTO x10*3/uL 301 320     Results from last 7 days   Lab Units 12/15/23  0305   INR  1.0     Chemistry       Results from last 7 days   Lab Units 12/15/23  0305   BNP pg/mL 41     Results from last 7 days   Lab Units 12/16/23  0452 12/15/23  0305   SODIUM mmol/L 136 138   POTASSIUM mmol/L 3.7 3.4*   CHLORIDE mmol/L 103 100   CO2 mmol/L 26 29   BUN mg/dL 19 22   CREATININE mg/dL 0.64 0.88   CALCIUM mg/dL 8.5* 9.4   ALBUMIN g/dL  --  4.1   PROTEIN TOTAL g/dL  --  7.7   BILIRUBIN TOTAL mg/dL  --  0.4   ALT U/L  --  20   AST U/L  --  19         No lab exists for component: \"SCVO2\"      Blood Gases        No lab exists for component: \"[id number]\", \"[id number]\", \"BEDEFICIT\"             Assessment/Plan   Atrial Fibrillation   Hypertension   History of tongue cancer   Polycythemia vera   Hypokalemia            - patient is on lopressor 25 mg po BID   -pending cardiology evaluation   - polycythemia vera needs evaluation as an outpatient  - Will continue to manage and replete critical electrolytes. Will keep  Mg >2.0, K >4.0, and Phos greater than 3.0.  - Will actively target with vasopressors , if needed, to keep MAP > 65.  - Appropriately maintain saturations above 92%.  - Continue to monitor UOP carefully to ensure at least 0.5ml/kg/h.  - Maintain appropriate sleep/wake cycles to avoid ICU delirium.     DVT prophylaxis: eliquis   GI prophylaxis:protonix   Code status: Full         The entirety of this visit history, physical exam, and diagnostic interpretation was done via audiovisual telemedicine.      Patient is located in [place]  and I am located in [place].   Upon my own and separate evaluation, this patient had a " high  probability of imminent of life-threatening deterioration due to atrial fibrillation ,  which required my direct attention, intervention and personal  management.     I have personally provided 42 minutes of critical care time exclusive  of time spent on separately billable procedures. Time includes review  of laboratory data, radiology results, discussion with consultants,  and monitoring of potential decompensation. Interventions were  performed as documented above.     Mukul Davis IV, MD  Pulmonary, Critical Care and Sleep Medicine  [facility]

## 2023-12-17 NOTE — NURSING NOTE
12-18-23 [id number] patient discharged home. Discharge instructions given and teach back done on new medications and follow up appointments. Verbalized understanding. All belongings with pt placed in w/c and taken to car with sister Delmer LEDESMA

## 2023-12-17 NOTE — DISCHARGE SUMMARY
Discharge Diagnosis  Paroxysmal A-fib (CMS/HCC), new onset  Non MI troponin elevation  Essential HTN  Hypokalemia    Issues Requiring Follow-Up      Discharge Meds     Your medication list        START taking these medications        Instructions Last Dose Given Next Dose Due   apixaban 5 mg tablet  Commonly known as: Eliquis      Take 1 tablet (5 mg) by mouth every 12 hours.       atorvastatin 80 mg tablet  Commonly known as: Lipitor      Take 0.5 tablets (40 mg) by mouth once daily at bedtime.       clopidogrel 75 mg tablet  Commonly known as: Plavix  Start taking on: December 18, 2023      Take 1 tablet (75 mg) by mouth once daily. Do not start before December 18, 2023.       flecainide 50 mg tablet  Commonly known as: Tambocor      Take 4 tablets all at once as needed for palpitations (fast heart rate episode)       losartan-hydrochlorothiazide 50-12.5 mg tablet  Commonly known as: Hyzaar      Take 1 tablet by mouth once daily.       metoprolol tartrate 25 mg tablet  Commonly known as: Lopressor      Take 1 tablet (25 mg) by mouth 2 times a day.              CONTINUE taking these medications        Instructions Last Dose Given Next Dose Due   aspirin 81 mg EC tablet           cholecalciferol 125 MCG (5000 UT) capsule  Commonly known as: Vitamin D-3           fluticasone 50 mcg/actuation nasal spray  Commonly known as: Flonase      Administer 1 spray into each nostril once daily. Shake gently. Before first use, prime pump. After use, clean tip and replace cap.              STOP taking these medications      hydroCHLOROthiazide 25 mg tablet  Commonly known as: HYDRODiuril                  Where to Get Your Medications        These medications were sent to [facility]  [address] [place] [id number]      Hours: 9 AM to 5:30 PM Mon-Fri Phone: [phone]   apixaban 5 mg tablet  atorvastatin 80 mg tablet  clopidogrel 75 mg tablet  flecainide 50 mg tablet  losartan-hydrochlorothiazide 50-12.5 mg  "tablet  metoprolol tartrate 25 mg tablet         Test Results Pending At Discharge  Pending Labs       No current pending labs.         68 y.o. female presenting with complaints of palpitations. Pt states that about 2am she woke up out of a sleep because she could feel her heart racing. She attempted to get her blood pressure with a home cuff but kept getting \"error\". She called the ambulance and they told her to take 4 baby ASA in addition to her daily baby ASA. She denies any chest pain but did feel some heaviness when she could feel her heart racing. Pt denies any shortness of breath or other associated symptoms. Pt was found to be in a-fib with ventricular rate in 160s. She was started on IV diltiazem and IV heparin. Pt converted to NSR in ED. Was admitted to ICU for medical management.     Hospital Course    #New onset paroxysmal atrial fibrillation with RVR  #Non-MI related elevated troponins  #Essential HTN  #Chronic Diastolic Heart Failure  -Afib with rate in 160s in ED  -Converted to NSR with IV diltiazem  -Trop 15 > 118 > 354 >   -BNP 41  -Mg 2.08  -TSH 0.61  -Echocardiogram: normal LVSF with an EF of 55-60% with impaired relaxation pattern of LVDF  -discontinued Heparin drip started in ED; continue per protocol  -DC IV diltiazem  -Continued metoprolol 20mg BID  -Continued apixaban  -Continue losartan  -Cardiac monitoring  -Cardiac diet  -Cardiology consult, appreciate recs  -Continue ASA, hydrochlorothiazide  -Monitor BP and HR      #Hypokalemia, resolved  -K 3.4 > today K 4.0  -Replete per protocol  -Daily BMP     #Vit D deficiency  -Continue cholecalciferol     #Allergies  -Continue fluticasone     DVT ppx  -Continue apixaban     Code Status: Full     Disposition: Patient was stable to be discharged to home. She was discharged on apixaban, metoprolol, losartan /hydrochlorothiazide, and flecainide. She will follow up with Cardiology and her PCP.    Total cumulative time spent in preparation of this " discharge including documentation review, coordination of care with the medical team including PT/SW/care coordinators and treating consultants, discussion with patient and pertinent family members and finalization of prescriptions, follow-up appointments, and this discharge summary was approximately 45 minutes.     Pertinent Physical Exam At Time of Discharge  Physical Exam  Vitals reviewed.   Constitutional:       Appearance: Normal appearance. She is obese.   HENT:      Head: Normocephalic and atraumatic.      Mouth/Throat:      Mouth: Mucous membranes are moist.      Pharynx: Oropharynx is clear.   Eyes:      Pupils: Pupils are equal, round, and reactive to light.   Cardiovascular:      Rate and Rhythm: Normal rate and regular rhythm.      Pulses: Normal pulses.      Heart sounds: Normal heart sounds.   Pulmonary:      Breath sounds: Normal breath sounds.   Abdominal:      General: Bowel sounds are normal.      Palpations: Abdomen is soft.   Musculoskeletal:         General: Normal range of motion.      Cervical back: Normal range of motion and neck supple.   Skin:     General: Skin is warm and dry.   Neurological:      General: No focal deficit present.      Mental Status: She is alert and oriented to person, place, and time.   Psychiatric:         Behavior: Behavior normal.         Outpatient Follow-Up  Future Appointments   Date Time Provider Department Center   1/8/2024 11:30 AM Mary Jane Alcantar MD [id number] [facility]         FER Bocanegra-CNP

## 2023-12-18 ENCOUNTER — TELEPHONE (OUTPATIENT)
Dept: PRIMARY CARE | Facility: CLINIC | Age: 68
End: 2023-12-18
Payer: MEDICARE

## 2023-12-18 NOTE — TELEPHONE ENCOUNTER
Transition of Care    Inpatient facility: [facility]  Discharge diagnosis: AFIB  Discharged to: HOME  Discharge date: 12/17/2023  Initial Call date: 12/18/2023  Spoke with patient/caregiver: PATIENT                                                                     Do you need assistance  visits prior to your PCP visit: No  Home health care ordered: No  Have you been contacted by home care and have a start of care date: No  Are you taking medications as prescribed at discharge: Yes    Referral to [facility] Pharmacist: No  Patient advised to bring all medications to PCP follow-up appointment.  Patient advised to follow discharge instructions until provider follow-up.  TCM visit date: 12/21/2023  TCM provider visit with:

## 2023-12-19 ENCOUNTER — PATIENT OUTREACH (OUTPATIENT)
Dept: CARE COORDINATION | Facility: CLINIC | Age: 68
End: 2023-12-19
Payer: MEDICARE

## 2023-12-19 NOTE — PROGRESS NOTES
Patient returned CM outreach call to patient to support a smooth transition of care from recent admission.  Reviewed discharge medications, discharge instructions, assessed social needs, and provided education on importance of follow-up appointment with provider.  Will continue to monitor through transition period.

## 2023-12-21 ENCOUNTER — OFFICE VISIT (OUTPATIENT)
Dept: PRIMARY CARE | Facility: CLINIC | Age: 68
End: 2023-12-21
Payer: MEDICARE

## 2023-12-21 VITALS
HEART RATE: 82 BPM | SYSTOLIC BLOOD PRESSURE: 112 MMHG | WEIGHT: 276.2 LBS | OXYGEN SATURATION: 97 % | BODY MASS INDEX: 44.6 KG/M2 | DIASTOLIC BLOOD PRESSURE: 62 MMHG

## 2023-12-21 DIAGNOSIS — C02.9 MALIGNANT NEOPLASM OF TONGUE (MULTI): ICD-10-CM

## 2023-12-21 DIAGNOSIS — E78.00 HYPERCHOLESTEREMIA: ICD-10-CM

## 2023-12-21 DIAGNOSIS — R73.9 HYPERGLYCEMIA: ICD-10-CM

## 2023-12-21 DIAGNOSIS — R79.89 ELEVATED TROPONIN I LEVEL: ICD-10-CM

## 2023-12-21 DIAGNOSIS — I10 HYPERTENSION, UNSPECIFIED TYPE: ICD-10-CM

## 2023-12-21 DIAGNOSIS — I48.91 NEW ONSET ATRIAL FIBRILLATION (MULTI): Primary | ICD-10-CM

## 2023-12-21 DIAGNOSIS — E66.01 CLASS 3 SEVERE OBESITY DUE TO EXCESS CALORIES WITH SERIOUS COMORBIDITY AND BODY MASS INDEX (BMI) OF 40.0 TO 44.9 IN ADULT (MULTI): ICD-10-CM

## 2023-12-21 DIAGNOSIS — G47.33 OSA (OBSTRUCTIVE SLEEP APNEA): ICD-10-CM

## 2023-12-21 PROCEDURE — 3008F BODY MASS INDEX DOCD: CPT | Performed by: INTERNAL MEDICINE

## 2023-12-21 PROCEDURE — 99496 TRANSJ CARE MGMT HIGH F2F 7D: CPT | Performed by: INTERNAL MEDICINE

## 2023-12-21 PROCEDURE — 3078F DIAST BP <80 MM HG: CPT | Performed by: INTERNAL MEDICINE

## 2023-12-21 PROCEDURE — 1159F MED LIST DOCD IN RCRD: CPT | Performed by: INTERNAL MEDICINE

## 2023-12-21 PROCEDURE — 1160F RVW MEDS BY RX/DR IN RCRD: CPT | Performed by: INTERNAL MEDICINE

## 2023-12-21 PROCEDURE — 3074F SYST BP LT 130 MM HG: CPT | Performed by: INTERNAL MEDICINE

## 2023-12-21 PROCEDURE — 1036F TOBACCO NON-USER: CPT | Performed by: INTERNAL MEDICINE

## 2023-12-21 PROCEDURE — 1111F DSCHRG MED/CURRENT MED MERGE: CPT | Performed by: INTERNAL MEDICINE

## 2023-12-21 PROCEDURE — 1126F AMNT PAIN NOTED NONE PRSNT: CPT | Performed by: INTERNAL MEDICINE

## 2023-12-21 ASSESSMENT — ENCOUNTER SYMPTOMS: FATIGUE: 1

## 2023-12-21 NOTE — PROGRESS NOTES
Subjective   Patient ID: Joy L Pallant is a 68 y.o. female who presents for TCM and Fatigue.  Fatigue  Associated symptoms include fatigue.     TCM    Sister  / Kaycee present H& P    Dx new onset A Fib       Converted to sinus with diltiazem IV in ED        Elevated troponins with chest pressure  (possible from tachycardia)  Now fatigue  Denies CP, dyspnea    Possible PHILLIP  Sleep consult    Wheeze, cough resolved     Allergic rhinitis  better     Hypertension stable on therapy no side effects     Hyperglycemia on diet  HBA1C  6.1   10-23     History of tongue cancer stable ENT following     Hypercholesterolemia on rx no side effects     Osteopenia  /  Vitamin D deficiency on supplementation no side effects     Diet and exercise reviewed     Review of Systems   Constitutional:  Positive for fatigue.   All other systems reviewed and are negative.      Objective   /62   Pulse 82   Wt 125 kg (276 lb 3.2 oz)   SpO2 97%   BMI 44.60 kg/m²   Lab Results   Component Value Date    WBC 10.4 12/17/2023    HGB 15.3 12/17/2023    HCT 49.6 (H) 12/17/2023     12/17/2023    CHOL 226 (H) 10/04/2023    TRIG 123 10/04/2023    HDL 52.9 10/04/2023    ALT 14 12/17/2023    AST 15 12/17/2023     12/17/2023    K 4.0 12/17/2023     12/17/2023    CREATININE 0.78 12/17/2023    BUN 18 12/17/2023    CO2 26 12/17/2023    TSH 0.61 12/15/2023    INR 1.0 12/15/2023    HGBA1C 6.1 (H) 10/04/2023           Physical Exam  Vitals reviewed.   Constitutional:       Appearance: Normal appearance. She is obese.   HENT:      Head: Normocephalic and atraumatic.      Mouth/Throat:      Pharynx: No posterior oropharyngeal erythema.   Eyes:      General: No scleral icterus.     Conjunctiva/sclera: Conjunctivae normal.      Pupils: Pupils are equal, round, and reactive to light.   Cardiovascular:      Rate and Rhythm: Normal rate and regular rhythm.      Heart sounds: Normal heart sounds.   Pulmonary:      Effort: No respiratory distress.       Breath sounds: No wheezing.   Abdominal:      General: Abdomen is flat. Bowel sounds are normal. There is no distension.      Palpations: Abdomen is soft. There is no mass.      Tenderness: There is no abdominal tenderness. There is no rebound.   Musculoskeletal:         General: Normal range of motion.      Cervical back: Normal range of motion and neck supple.   Skin:     General: Skin is warm and dry.   Neurological:      General: No focal deficit present.      Mental Status: She is alert and oriented to person, place, and time. Mental status is at baseline.   Psychiatric:         Mood and Affect: Mood normal.         Behavior: Behavior normal.         Thought Content: Thought content normal.         Judgment: Judgment normal.         Problem List Items Addressed This Visit             ICD-10-CM       Cardiac and Vasculature    Hypertension I10       Endocrine/Metabolic    Hyperglycemia R73.9       Hematology and Neoplasia    Malignant neoplasm of tongue (CMS/HCC) C02.9     Other Visit Diagnoses         Codes    New onset atrial fibrillation (CMS/[facility])    -  Primary I48.91    Elevated troponin I level     R79.89    PHILLIP (obstructive sleep apnea)     G47.33    Relevant Orders    Referral to Adult Sleep Medicine    Hypercholesteremia     E78.00    Class 3 severe obesity due to excess calories with serious comorbidity and body mass index (BMI) of 40.0 to 44.9 in adult (CMS/HCC)     E66.01, Z68.41          Assessment/Plan     TCM    Sister  / Kaycee present H& P    Dx new onset A Fib       Converted to sinus with diltiazem IV in ED        Elevated troponins with chest pressure  (possible from tachycardia)  Now fatigue  Denies CP, dyspnea  Cardio following    Possible PHILLIP  Sleep consult    Wheeze, cough resolved     Allergic rhinitis  better     Hypertension stable on therapy no side effects     Hyperglycemia on diet  HBA1C  6.1   10-23     History of tongue cancer stable ENT following     Hypercholesterolemia on rx  no side effects     Osteopenia  /  Vitamin D deficiency on supplementation no side effects     Diet and exercise reviewed    Mammogram 3-23  Dexa 3-23  Colonoscopy pt declined    Follow up as scheduled

## 2023-12-28 ENCOUNTER — OFFICE VISIT (OUTPATIENT)
Dept: SLEEP MEDICINE | Facility: CLINIC | Age: 68
End: 2023-12-28
Payer: MEDICARE

## 2023-12-28 VITALS
OXYGEN SATURATION: 94 % | SYSTOLIC BLOOD PRESSURE: 144 MMHG | DIASTOLIC BLOOD PRESSURE: 80 MMHG | HEART RATE: 63 BPM | WEIGHT: 275.6 LBS | BODY MASS INDEX: 44.5 KG/M2

## 2023-12-28 DIAGNOSIS — R53.83 FATIGUE, UNSPECIFIED TYPE: ICD-10-CM

## 2023-12-28 DIAGNOSIS — E66.01 OBESITY, CLASS III, BMI 40-49.9 (MORBID OBESITY) (MULTI): ICD-10-CM

## 2023-12-28 DIAGNOSIS — G47.9 SLEEP DISTURBANCE: ICD-10-CM

## 2023-12-28 DIAGNOSIS — G47.33 OSA (OBSTRUCTIVE SLEEP APNEA): Primary | ICD-10-CM

## 2023-12-28 DIAGNOSIS — I48.11 LONGSTANDING PERSISTENT ATRIAL FIBRILLATION (MULTI): ICD-10-CM

## 2023-12-28 DIAGNOSIS — I10 PRIMARY HYPERTENSION: ICD-10-CM

## 2023-12-28 PROCEDURE — 1160F RVW MEDS BY RX/DR IN RCRD: CPT

## 2023-12-28 PROCEDURE — 1036F TOBACCO NON-USER: CPT

## 2023-12-28 PROCEDURE — 1111F DSCHRG MED/CURRENT MED MERGE: CPT

## 2023-12-28 PROCEDURE — 99204 OFFICE O/P NEW MOD 45 MIN: CPT

## 2023-12-28 PROCEDURE — 3008F BODY MASS INDEX DOCD: CPT

## 2023-12-28 PROCEDURE — 1159F MED LIST DOCD IN RCRD: CPT

## 2023-12-28 PROCEDURE — 1126F AMNT PAIN NOTED NONE PRSNT: CPT

## 2023-12-28 PROCEDURE — 3079F DIAST BP 80-89 MM HG: CPT

## 2023-12-28 PROCEDURE — 3077F SYST BP >= 140 MM HG: CPT

## 2023-12-28 ASSESSMENT — SLEEP AND FATIGUE QUESTIONNAIRES
HOW LIKELY ARE YOU TO NOD OFF OR FALL ASLEEP WHILE SITTING AND READING: WOULD NEVER DOZE
HOW LIKELY ARE YOU TO NOD OFF OR FALL ASLEEP WHILE SITTING QUIETLY AFTER LUNCH WITHOUT ALCOHOL: WOULD NEVER DOZE
HOW LIKELY ARE YOU TO NOD OFF OR FALL ASLEEP WHILE SITTING AND TALKING TO SOMEONE: WOULD NEVER DOZE
HOW LIKELY ARE YOU TO NOD OFF OR FALL ASLEEP WHILE LYING DOWN TO REST IN THE AFTERNOON WHEN CIRCUMSTANCES PERMIT: MODERATE CHANCE OF DOZING
SATISFACTION_WITH_CURRENT_SLEEP_PATTERN: SATISFIED
SLEEP_PROBLEM_INTERFERES_DAILY_ACTIVITIES: A LITTLE
HOW LIKELY ARE YOU TO NOD OFF OR FALL ASLEEP IN A CAR, WHILE STOPPED FOR A FEW MINUTES IN TRAFFIC: WOULD NEVER DOZE
HOW LIKELY ARE YOU TO NOD OFF OR FALL ASLEEP WHILE WATCHING TV: WOULD NEVER DOZE
ESS-CHAD TOTAL SCORE: 4
WORRIED_DISTRESSED_DUE_TO_SLEEP: A LITTLE
SLEEP_PROBLEM_NOTICEABLE_TO_OTHERS: A LITTLE
HOW LIKELY ARE YOU TO NOD OFF OR FALL ASLEEP WHEN YOU ARE A PASSENGER IN A CAR FOR AN HOUR WITHOUT A BREAK: SLIGHT CHANCE OF DOZING
SITING INACTIVE IN A PUBLIC PLACE LIKE A CLASS ROOM OR A MOVIE THEATER: SLIGHT CHANCE OF DOZING

## 2023-12-28 ASSESSMENT — PATIENT HEALTH QUESTIONNAIRE - PHQ9
SUM OF ALL RESPONSES TO PHQ9 QUESTIONS 1 AND 2: 0
1. LITTLE INTEREST OR PLEASURE IN DOING THINGS: NOT AT ALL
2. FEELING DOWN, DEPRESSED OR HOPELESS: NOT AT ALL

## 2023-12-28 ASSESSMENT — ENCOUNTER SYMPTOMS
LIGHT-HEADEDNESS: 0
CHEST TIGHTNESS: 0
SLEEP DISTURBANCE: 1
COUGH: 0
FATIGUE: 1
WHEEZING: 0
WEAKNESS: 0
ARTHRALGIAS: 0
APNEA: 0
FREQUENCY: 0
DIZZINESS: 0
CONFUSION: 0
BRUISES/BLEEDS EASILY: 0
BACK PAIN: 0
DECREASED CONCENTRATION: 0
HALLUCINATIONS: 0
HEADACHES: 0
MYALGIAS: 0
NERVOUS/ANXIOUS: 0
ABDOMINAL PAIN: 0
SHORTNESS OF BREATH: 0
PALPITATIONS: 0
SEIZURES: 0
TREMORS: 0

## 2023-12-28 ASSESSMENT — ANXIETY QUESTIONNAIRES
7. FEELING AFRAID AS IF SOMETHING AWFUL MIGHT HAPPEN: NOT AT ALL
2. NOT BEING ABLE TO STOP OR CONTROL WORRYING: NOT AT ALL
GAD7 TOTAL SCORE: 1
3. WORRYING TOO MUCH ABOUT DIFFERENT THINGS: NOT AT ALL
1. FEELING NERVOUS, ANXIOUS, OR ON EDGE: NOT AT ALL
5. BEING SO RESTLESS THAT IT IS HARD TO SIT STILL: NOT AT ALL
6. BECOMING EASILY ANNOYED OR IRRITABLE: NOT AT ALL
4. TROUBLE RELAXING: SEVERAL DAYS

## 2023-12-28 NOTE — LETTER
Your Provider has ordered you a sleep study.  The process for a home sleep study is as follows:    HOME SLEEP STUDY    Your test was ordered today. It will usually take 2 - 3 business days for Insurance to approve the order.     Once you test is approved it will be sent to the ordering sleep lab. When the sleep lab is notified of the new order, they will reach out to you to get you scheduled for a pick-up date for your Home Sleep Study Kit or notify you of when it will be mailed (CLEVMED).     They usually will reach out to you about 1 week after your test is ordered. Please contact the office at [phone] if you have not heard back from them in 2 weeks after you have seen your provider. See below for list of sleep lab contact numbers, if needed.       Sleep Lab Contact Information:   Main Phone Line (scheduling only): [phone]), option 3  Adult and Pediatric Locations  [facility] (6 years and older): [facility] - 4th floor ([facility]) After hours line: [phone]  [facility] (Main campus: All ages): [facility], 6th floor. After hours line: [phone]   [place] (5 years and older; younger considered on case-by-case basis): [address]; [address], [address]. Scheduling  After hours line: [phone]   [place] (6 years and older): [address]; [address]; [address]   [place] (6 years and older): [address] [facility], [address]  After hours line: [phone]   [place] (13 years and older) in [place]: [address], 2nd floor  After hours line: [phone]   [place] (13 year and older): [address]  After hours line: [phone]     Adult Only Locations:   [place] (18 years and older): [address] [facility], 2nd floor   [place] (18 years and older): [address] [facility]; 4th floor  After hours line: [phone]  [facility] (18 years and older) at  [place]: [address]  After hours line: [phone]

## 2023-12-28 NOTE — PATIENT INSTRUCTIONS
It was a pleasure meeting you today Joy L Pallant     As we discussed today in clinic:    1. Do in-home sleep testing.    2. Encouraged to lose weight.   3. Remember, don't drive when sleepy.   4. Stay off your back when sleeping.      As a general guideline, please replace your: PAP cushions every 2-4 weeks, mask every 3-6 months, hose every 3-6 months, Filter (disposable) every 2-4 weeks; machine may need replacement in 5-10 years (once they stop working).     Education handouts given: Sleep Study Information    Please follow-up in 4 - 6 weeks after testing    ALWAYS BRING YOUR CPAP / BIPAP WITH YOU TO EVERY APPOINTMENT!  THANKS    EDUCATION WEBSITES for further information:   1. American Academy of Sleep Medicine http://sleepeducation.org  2. National Sleep Foundation: https://sleepfoundation.org  3. American Sleep Apnea Association: https://www.sleepapnea.org (for patients with sleep apnea)  4. Go to www.inspiresleep.[place] learn about [employer].    FOR QUESTIONS AND CONCERNS:   1. In case of problems with machine or mask interface, please contact your DME company first. DME is the company that provides you the machine and/or CPAP supplies. If [employer] if your DME, you can reach them at [phone] or [employer]) [phone].  2. For SLEEP STUDY appointments, please call [phone] ([place]) or [phone] / [phone] ([facility]) or any other  Sleep Lab location please call [phone]  3. For MEDICAL QUESTIONS, MEDICATION REFILLS, or CLINIC APPOINTMENT SCHEDULING, please call [phone] and my practice lead Yari would gladly assist you with any concerns.   4. In the event that you are running more than 10 minutes late to your appointment or 5 minutes to virtual, I will kindly ask you to reschedule.    Here at [facility], we wish you a restful sleep!

## 2023-12-28 NOTE — PROGRESS NOTES
Patient: Joy L Pallant  : 1955 AGE: 68 y.o. SEX:female   MRN: [id number]   Provider: [facility]     Location [facility]   Service Date: 2023     PCP: Mary Jane Alcantar MD   Referred by: Mary Jane Alcantar MD            [facility] [facility]  NEW PATIENT VISIT NOTE      HISTORY OF PRESENT ILLNESS     The patient's referring provider is: Mary Jane Alcantar MD  The patient's Primary Care Provider: Mary Jane Alcantar MD    HISTORY OF PRESENT ILLNESS   Joy L Pallant is a 68 y.o. female with a pertinent hx of sleep disturbance, fatigue, obesity, HTN, Afib, HLD, allergic rhinitis, cataract, vitamin D deficiency, and chronic pain, who presents to a [facility] for a sleep medicine evaluation with concerns of evaluation for sleep apnea after new onset of Afib. Patient is accompanied by her sister Kaycee who added to history.     Patient reports she was recently (12/15) hospitalized for new onset Afib. Patient has a significant family hx of PHILLIP. Never had a sleep study in the past. She reports snoring, wakes up x2 per night for nocturia. Has daytime fatigue. She was sent by PCP & Cardiology to r/o PHILLIP as trigger of Afib.     I discussed the relationship of PHILLIP and Afib as well as the pathophysiology of PHILLIP. We discussed testing options for sleep apnea, HSAT vs in-lab PSG. HSAT is reasonable as patient likely has PHILLIP based on history and exam and does not have any of the following comorbidities: CHF, seizures, neuromuscular weakness, hypoventilation, or significant COPD.   We briefly discussed treatment option but will go over more after testing. She is aware of CPAP as several siblings have one.       SLEEP STUDY HISTORY  HSAT ordered today    SLEEP-WAKE SCHEDULE  Bedtime:  10 - 11 pm daily  Subjective sleep latency: 15 mins  Difficulty falling asleep: No  Number of awakenings: x2 times per night spontaneously    Falls back asleep in 10 mins or less  Difficulty staying asleep: No  due to nocturia  Final wake time:  6 - 630 am daily  Out of bed time:  6 - 630 am daily  Shift work: denied  Naps: yes - 2 hrs between 2 - 4 pm  Feels rested after a nap: Yes   Average sleep duration (excluding naps): 9 - 10 hrs    SLEEP ENVIRONMENT  Sleep location: bed  Sleep status: sleeps alone  Preferred sleep position: back, stomach, and side  TV in bedroom:   Room is dark:  Yes  Room is quiet: Yes  Room is cool: Yes  Bed comfort: good    SLEEP HABITS   Activities before bedtime: TV  Activities in bed:   Clockwatching: No   Smoking: never  ETOH: denied  Marijuana: denied  Caffeine: occasionally  Sleep aids: denied    WEIGHT: stable    Claustrophobia: No     STOP-BAN  ESS: 4   KUMAR: 5  PHQ-2:  NEGATIVE SCREEN   JENISE-7: 1      REVIEW OF SYSTEMS     REVIEW OF SYSTEMS  Sleep-related ROS:  Night symptoms: POSITIVE for snoring and nocturia and NEGATIVE for witnessed apnea, wake up gasping and/or choking for air, nasal congestion and/or post nasal drip, mouth breathing, night sweats during sleep, waking up with racing heart, heartburn or sour taste in mouth at night, and nocturnal cough  Morning symptoms: POSITIVE for unrefreshing sleep and NEGATIVE for morning headache, morning dry mouth, and morning sore throat  Daytime symptoms POSITIVE for excessive daytime sleepiness and fatigue and NEGATIVE for trouble remembering things in daytime, trouble staying focused in daytime, irritability in daytime, and drowsy driving  Hypersomnia / narcolepsy symptoms: Patient denies symptoms of a hypersomnolence disorder such as sleep paralysis, sleep-related hallucinations, recurrent sleep attacks, automatic behaviors, and cataplexy.   Parasomnia symptoms: Patient denies symptoms of parasomnia.  Movements in sleep: Patient denies problematic movements in sleep,     RLS screen:  RLSSCREEN: - Sensations: Patient does not have unusual sensations in their  extremities that cause an urge to move them   - Movement: Patient has not been told that their legs kick or jerk during sleep    Naps:   Yes. Naps ARE/ARENOT: are refreshing.  Fatigue: symptoms bothersome, but easily able to carry out all usual work/school/family activities    Review of Systems   Constitutional:  Positive for fatigue.   HENT:  Negative for congestion, postnasal drip and tinnitus.    Respiratory:  Negative for apnea, cough, chest tightness, shortness of breath and wheezing.    Cardiovascular:  Negative for chest pain, palpitations and leg swelling.   Gastrointestinal:  Negative for abdominal pain.   Genitourinary:  Negative for enuresis and frequency.   Musculoskeletal:  Negative for arthralgias, back pain, gait problem and myalgias.   Skin: Negative.    Allergic/Immunologic: Positive for environmental allergies.   Neurological:  Negative for dizziness, tremors, seizures, weakness, light-headedness and headaches.   Hematological:  Does not bruise/bleed easily.   Psychiatric/Behavioral:  Positive for sleep disturbance. Negative for confusion, decreased concentration, hallucinations and suicidal ideas. The patient is not nervous/anxious.    All other systems reviewed and are negative.      All other systems have been reviewed and are negative.      ALLERGIES AND MEDICATIONS     ALLERGIES  Allergies   Allergen Reactions    Penicillins Hives and Rash       MEDICATIONS  Current Outpatient Medications   Medication Sig Dispense Refill    apixaban (Eliquis) 5 mg tablet Take 1 tablet (5 mg) by mouth every 12 hours. 60 tablet 0    aspirin 81 mg EC tablet Take by mouth.      atorvastatin (Lipitor) 40 mg tablet Take 1 tablet (40 mg) by mouth once daily. 30 tablet 0    cholecalciferol (Vitamin D-3) 125 MCG (5000 UT) capsule Take 1 capsule (125 mcg) by mouth. Take 1 capsule by mouth on Monday, Wednesday, and Friday.      clopidogrel (Plavix) 75 mg tablet Take 1 tablet (75 mg) by mouth once daily. Do not start  "before December 18, 2023. 30 tablet 0    flecainide (Tambocor) 50 mg tablet Take 4 tablets all at once as needed for palpitations (fast heart rate episode) 32 tablet 0    losartan-hydrochlorothiazide (Hyzaar) 50-12.5 mg tablet Take 1 tablet by mouth once daily. 30 tablet 0    metoprolol tartrate (Lopressor) 25 mg tablet Take 1 tablet (25 mg) by mouth 2 times a day. 60 tablet 0     No current facility-administered medications for this visit.         PAST HISTORY     PERTINENT PAST MEDICAL HISTORY: See HPI    PERTINENT PAST SURGICAL HISTORY for Sleep Medicine:  non-contributory    PERTINENT FAMILY HISTORY for Sleep Medicine:  sleep apnea- siblings    PERTINENT SOCIAL HISTORY:  She  reports that she has never smoked. She has never been exposed to tobacco smoke. She has never used smokeless tobacco. She reports that she does not drink alcohol and does not use drugs. She currently lives alone and retired    Active Problems, Allergy List, Medication List, and PMH/PSH/FH/Social Hx have been reviewed and reconciled in chart. No significant changes unless documented in the pertinent chart section. Updates made when necessary.       PHYSICAL EXAM     VITAL SIGNS: /80   Pulse 63   Wt 125 kg (275 lb 9.6 oz)   SpO2 94%   BMI 44.50 kg/m²     NECK CIRCUMFERENCE:     CURRENT WEIGHT:   Vitals:    12/28/23 0937   Weight: 125 kg (275 lb 9.6 oz)      BMI: Body mass index is 44.5 kg/m².      PREVIOUS WEIGHTS:  Wt Readings from Last 3 Encounters:   12/28/23 125 kg (275 lb 9.6 oz)   12/21/23 125 kg (276 lb 3.2 oz)   12/17/23 123 kg (270 lb 4.5 oz)       Physical Exam  Constitutional: Awake, not in distress  Lungs: Clear to auscultation bilateral, no cough noted  Heart: Regular rate and rhythm  Skin: Warm, no rash  Neuro: No tremors, moves all extremities  Psych: alert and oriented to time, place, and person    ENT: Modified Mallampati score - IV            RESULTS/DATA     No results found for: \"IRON\", \"TRANSFERRIN\", \"IRONSAT\", " "\"TIBC\", \"FERRITIN\"    Bicarbonate   Date Value Ref Range Status   12/17/2023 26 21 - 32 mmol/L Final       PAP Adherence  Not applicable      ASSESSMENT/PLAN     Assessment/Plan   Joy L Pallant is a 68 y.o. female presents today in [facility] with the following problems:    SLEEP DISORDERED BREATHING/SUSPECTED SLEEP APNEA  - Patient's risk factors for PHILLIP: BMI, age, neck circumference, postmenopause, HTN, Afib, family history, and narrow crowded upper airway anatomy  - We discussed testing options today in clinic, HSAT vs In-lab  - HSAT is reasonable as patient likely has PHILLIP based on history and exam and does not have any of the following comorbidities: CHF, seizures, neuromuscular weakness, hypoventilation, or significant COPD.   - Discussed PHILLIP pathophysiology, diagnostic testing (HST vs PSG), cardiometabolic and neurocognitive sequelae of untreated PHILLIP, and treatment options (PAP therapy, oral appliance, surgery, hypoglossal nerve stimulator called INSPIRE, etc).      INADEQUATE POOR SLEEP HYGIENE / EXCESSIVE DAYTIME SLEEPINESS / FATIGUE + SLEEP DISTURBANCES  - due to combination of poor sleep hygiene, anxiety, untreated sleep apnea, chronic pain, and nocturia.  - discussed with patient good sleep hygiene  - will reevaluate after successful testing and treatment of PHILLIP      OBESITY   - BMI today Body mass index is 44.5 kg/m².   - most recent Bicarb WNL, low suspicion for obesity hypoventilation syndrome  - Encouraged patient to lose weight with diet and exercise.   - Weight loss can help in the long term treatment of PHILLIP.  - Defer management to PCP      HYPERTENSION  - BP today 144/80  - denies any headache, blurry vision, chest pain, palpitation, dizziness, lightheadedness, or syncopal episodes  - encouraged daily exercise with healthy diet for BP and PHILLIP management  - on meds per PCP  - Defer management to PCP    ATRIAL FIBRILLATION  New onset 12/15/2023  - discuss relationship " of PHILLIP and Afib in regards to treatment, this is a long-term treatment, verbalized understanding  - plan to r/o PHILLIP as possible trigger of Afib - if positive will treat  - on meds per Cardio  - Defer management to Cardio      All of patient's questions were answered. She verbalizes understanding and agreement with my assessment and plan.

## 2024-01-08 ENCOUNTER — APPOINTMENT (OUTPATIENT)
Dept: PRIMARY CARE | Facility: CLINIC | Age: 69
End: 2024-01-08
Payer: MEDICARE

## 2024-01-09 LAB
ATRIAL RATE: 156 BPM
ATRIAL RATE: 79 BPM
P AXIS: 31 DEGREES
P OFFSET: 205 MS
P ONSET: 150 MS
PR INTERVAL: 140 MS
Q ONSET: 220 MS
Q ONSET: 222 MS
QRS COUNT: 12 BEATS
QRS COUNT: 26 BEATS
QRS DURATION: 88 MS
QRS DURATION: 96 MS
QT INTERVAL: 290 MS
QT INTERVAL: 390 MS
QTC CALCULATION(BAZETT): 447 MS
QTC CALCULATION(BAZETT): 474 MS
QTC FREDERICIA: 403 MS
QTC FREDERICIA: 427 MS
R AXIS: -23 DEGREES
R AXIS: -9 DEGREES
T AXIS: 109 DEGREES
T AXIS: 99 DEGREES
T OFFSET: 367 MS
T OFFSET: 415 MS
VENTRICULAR RATE: 161 BPM
VENTRICULAR RATE: 79 BPM

## 2024-01-22 ENCOUNTER — PROCEDURE VISIT (OUTPATIENT)
Dept: SLEEP MEDICINE | Facility: CLINIC | Age: 69
End: 2024-01-22
Payer: MEDICARE

## 2024-01-22 DIAGNOSIS — G47.33 OSA (OBSTRUCTIVE SLEEP APNEA): ICD-10-CM

## 2024-01-22 PROCEDURE — 95806 SLEEP STUDY UNATT&RESP EFFT: CPT | Performed by: PSYCHIATRY & NEUROLOGY

## 2024-01-22 NOTE — PROGRESS NOTES
The patient received equipment and instructions for use of the [employer] [facility] Nomad HSAT device. The patient was instructed how to apply the effort belts, cannula, thermistor. It was also explained how the Nomad and oximeter components work.  The patient was asked to record their sleep for an 8-hour period.     The patient was informed of their responsibility for the device and acknowledged this by signing the HSAT device contract. The patient was asked to return the device on 01/25/2024 to [facility]'s front .     The patient was instructed to call 911 as usual for any medical- emergencies while at home.  The patient was also given a phone number for troubleshooting when using the device in case there were additional questions.

## 2024-01-29 ENCOUNTER — OFFICE VISIT (OUTPATIENT)
Dept: PRIMARY CARE | Facility: CLINIC | Age: 69
End: 2024-01-29
Payer: MEDICARE

## 2024-01-29 VITALS
HEART RATE: 74 BPM | BODY MASS INDEX: 44.63 KG/M2 | DIASTOLIC BLOOD PRESSURE: 84 MMHG | WEIGHT: 276.4 LBS | SYSTOLIC BLOOD PRESSURE: 138 MMHG | OXYGEN SATURATION: 97 %

## 2024-01-29 DIAGNOSIS — G47.33 OSA (OBSTRUCTIVE SLEEP APNEA): ICD-10-CM

## 2024-01-29 DIAGNOSIS — Z12.31 ENCOUNTER FOR SCREENING MAMMOGRAM FOR MALIGNANT NEOPLASM OF BREAST: ICD-10-CM

## 2024-01-29 DIAGNOSIS — C02.9 MALIGNANT NEOPLASM OF TONGUE (MULTI): ICD-10-CM

## 2024-01-29 DIAGNOSIS — E78.00 HYPERCHOLESTEREMIA: ICD-10-CM

## 2024-01-29 DIAGNOSIS — I48.0 PAROXYSMAL ATRIAL FIBRILLATION (MULTI): Primary | ICD-10-CM

## 2024-01-29 DIAGNOSIS — I10 HYPERTENSION, UNSPECIFIED TYPE: ICD-10-CM

## 2024-01-29 PROCEDURE — 1159F MED LIST DOCD IN RCRD: CPT | Performed by: INTERNAL MEDICINE

## 2024-01-29 PROCEDURE — 1036F TOBACCO NON-USER: CPT | Performed by: INTERNAL MEDICINE

## 2024-01-29 PROCEDURE — 1126F AMNT PAIN NOTED NONE PRSNT: CPT | Performed by: INTERNAL MEDICINE

## 2024-01-29 PROCEDURE — 99214 OFFICE O/P EST MOD 30 MIN: CPT | Performed by: INTERNAL MEDICINE

## 2024-01-29 PROCEDURE — 3075F SYST BP GE 130 - 139MM HG: CPT | Performed by: INTERNAL MEDICINE

## 2024-01-29 PROCEDURE — 3079F DIAST BP 80-89 MM HG: CPT | Performed by: INTERNAL MEDICINE

## 2024-01-29 PROCEDURE — 3008F BODY MASS INDEX DOCD: CPT | Performed by: INTERNAL MEDICINE

## 2024-01-29 PROCEDURE — 1160F RVW MEDS BY RX/DR IN RCRD: CPT | Performed by: INTERNAL MEDICINE

## 2024-01-29 RX ORDER — METOPROLOL TARTRATE 25 MG/1
25 TABLET, FILM COATED ORAL 2 TIMES DAILY
Qty: 60 TABLET | Refills: 0 | Status: SHIPPED | OUTPATIENT
Start: 2024-01-29 | End: 2024-04-17 | Stop reason: SDUPTHER

## 2024-01-29 RX ORDER — ATORVASTATIN CALCIUM 40 MG/1
40 TABLET, FILM COATED ORAL DAILY
Qty: 30 TABLET | Refills: 0 | Status: SHIPPED | OUTPATIENT
Start: 2024-01-29 | End: 2024-04-17 | Stop reason: SDUPTHER

## 2024-01-29 RX ORDER — LOSARTAN POTASSIUM AND HYDROCHLOROTHIAZIDE 25; 100 MG/1; MG/1
1 TABLET ORAL DAILY
COMMUNITY
Start: 2024-01-19 | End: 2024-04-17 | Stop reason: SDUPTHER

## 2024-01-29 NOTE — PROGRESS NOTES
Subjective   Patient ID: Joy L Pallant is a 69 y.o. female who presents for 3 mth medical management.  HPI    Routine follow up    sister  / Kaycee present H& P    No complaints     P A Fib  Converted to sinus with diltiazem IV in ED  Elevated troponins with chest pressure  (possible from tachycardia)  Denies CP, dyspnea  Cardio following     Possible PHILLIP  Sleep consult pending     Wheeze, cough resolved     Allergic rhinitis  better     Hypertension stable on therapy no side effects     Hyperglycemia on diet  HBA1C  6.1   10-23     History of tongue cancer stable ENT following     Hypercholesterolemia on rx no side effects     Osteopenia  /  Vitamin D deficiency on supplementation no side effects     Diet and exercise reviewed    Review of Systems   All other systems reviewed and are negative.      Objective   /84   Pulse 74   Wt 125 kg (276 lb 6.4 oz)   SpO2 97%   BMI 44.63 kg/m²   Lab Results   Component Value Date    WBC 10.4 12/17/2023    HGB 15.3 12/17/2023    HCT 49.6 (H) 12/17/2023     12/17/2023    CHOL 226 (H) 10/04/2023    TRIG 123 10/04/2023    HDL 52.9 10/04/2023    ALT 14 12/17/2023    AST 15 12/17/2023     12/17/2023    K 4.0 12/17/2023     12/17/2023    CREATININE 0.78 12/17/2023    BUN 18 12/17/2023    CO2 26 12/17/2023    TSH 0.61 12/15/2023    INR 1.0 12/15/2023    HGBA1C 6.1 (H) 10/04/2023           Physical Exam  Vitals reviewed.   Constitutional:       Appearance: Normal appearance. She is obese.   HENT:      Head: Normocephalic and atraumatic.      Mouth/Throat:      Pharynx: No posterior oropharyngeal erythema.   Eyes:      General: No scleral icterus.     Conjunctiva/sclera: Conjunctivae normal.      Pupils: Pupils are equal, round, and reactive to light.   Cardiovascular:      Rate and Rhythm: Normal rate and regular rhythm.      Heart sounds: Normal heart sounds.   Pulmonary:      Effort: No respiratory distress.      Breath sounds: No wheezing.   Abdominal:       General: Abdomen is flat. Bowel sounds are normal. There is no distension.      Palpations: Abdomen is soft. There is no mass.      Tenderness: There is no abdominal tenderness. There is no rebound.   Musculoskeletal:         General: Normal range of motion.      Cervical back: Normal range of motion and neck supple.   Skin:     General: Skin is warm and dry.   Neurological:      General: No focal deficit present.      Mental Status: She is alert and oriented to person, place, and time. Mental status is at baseline.   Psychiatric:         Mood and Affect: Mood normal.         Behavior: Behavior normal.         Thought Content: Thought content normal.         Judgment: Judgment normal.         Problem List Items Addressed This Visit             ICD-10-CM       Cardiac and Vasculature    Hypertension I10    Relevant Medications    metoprolol tartrate (Lopressor) 25 mg tablet    A-fib (CMS/[facility]) - Primary I48.91    Relevant Medications    metoprolol tartrate (Lopressor) 25 mg tablet       Endocrine/Metabolic    BMI 40.0-44.9, adult (CMS/HCC) Z68.41       Hematology and Neoplasia    Malignant neoplasm of tongue (CMS/[facility]) C02.9       Sleep    PHILLIP (obstructive sleep apnea) G47.33     Other Visit Diagnoses         Codes    Hypercholesteremia     E78.00    Relevant Medications    atorvastatin (Lipitor) 40 mg tablet    Encounter for screening mammogram for malignant neoplasm of breast     Z12.31    Relevant Orders    BI mammo bilateral screening tomosynthesis          Assessment/Plan   sister  / Kaycee present H& P     P A Fib  Converted to sinus with diltiazem IV in ED  Elevated troponins with chest pressure  (possible from tachycardia)  Denies CP, dyspnea  Cardio following     Possible PHILLIP  Sleep consult pending     Wheeze, cough resolved     Allergic rhinitis  better     Hypertension stable on therapy no side effects     Hyperglycemia on diet  HBA1C  6.1   10-23     History of tongue cancer stable ENT following      Hypercholesterolemia on rx no side effects     Osteopenia  /  Vitamin D deficiency on supplementation no side effects     Diet and exercise reviewed    Mammogram 3-23  Dexa 3-23  Colonoscopy pt declined    Check mammogram    Follow up 3 months

## 2024-02-05 ENCOUNTER — PATIENT OUTREACH (OUTPATIENT)
Dept: CARE COORDINATION | Facility: CLINIC | Age: 69
End: 2024-02-05
Payer: MEDICARE

## 2024-02-05 NOTE — PROGRESS NOTES
Follow up by CM, patient compliant with follow up care since hospital discharge with no needs identified. No further outreach.

## 2024-02-14 ENCOUNTER — OFFICE VISIT (OUTPATIENT)
Dept: SLEEP MEDICINE | Facility: CLINIC | Age: 69
End: 2024-02-14
Payer: MEDICARE

## 2024-02-14 VITALS
SYSTOLIC BLOOD PRESSURE: 142 MMHG | BODY MASS INDEX: 44.41 KG/M2 | DIASTOLIC BLOOD PRESSURE: 76 MMHG | WEIGHT: 275 LBS | HEART RATE: 72 BPM | OXYGEN SATURATION: 95 %

## 2024-02-14 DIAGNOSIS — G47.9 SLEEP DISTURBANCE: ICD-10-CM

## 2024-02-14 DIAGNOSIS — G47.33 OSA (OBSTRUCTIVE SLEEP APNEA): Primary | ICD-10-CM

## 2024-02-14 DIAGNOSIS — Z13.31 NEGATIVE DEPRESSION SCREENING: ICD-10-CM

## 2024-02-14 DIAGNOSIS — E66.01 OBESITY, CLASS III, BMI 40-49.9 (MORBID OBESITY) (MULTI): ICD-10-CM

## 2024-02-14 DIAGNOSIS — I48.11 LONGSTANDING PERSISTENT ATRIAL FIBRILLATION (MULTI): ICD-10-CM

## 2024-02-14 DIAGNOSIS — R53.83 FATIGUE, UNSPECIFIED TYPE: ICD-10-CM

## 2024-02-14 DIAGNOSIS — I10 PRIMARY HYPERTENSION: ICD-10-CM

## 2024-02-14 DIAGNOSIS — Z78.9 NEVER SMOKED TOBACCO: ICD-10-CM

## 2024-02-14 PROCEDURE — 1160F RVW MEDS BY RX/DR IN RCRD: CPT

## 2024-02-14 PROCEDURE — 99214 OFFICE O/P EST MOD 30 MIN: CPT

## 2024-02-14 PROCEDURE — 1159F MED LIST DOCD IN RCRD: CPT

## 2024-02-14 PROCEDURE — 3077F SYST BP >= 140 MM HG: CPT

## 2024-02-14 PROCEDURE — 1036F TOBACCO NON-USER: CPT

## 2024-02-14 PROCEDURE — 3008F BODY MASS INDEX DOCD: CPT

## 2024-02-14 PROCEDURE — 1126F AMNT PAIN NOTED NONE PRSNT: CPT

## 2024-02-14 PROCEDURE — 3078F DIAST BP <80 MM HG: CPT

## 2024-02-14 ASSESSMENT — SLEEP AND FATIGUE QUESTIONNAIRES
HOW LIKELY ARE YOU TO NOD OFF OR FALL ASLEEP WHILE SITTING QUIETLY AFTER LUNCH WITHOUT ALCOHOL: WOULD NEVER DOZE
HOW LIKELY ARE YOU TO NOD OFF OR FALL ASLEEP IN A CAR, WHILE STOPPED FOR A FEW MINUTES IN TRAFFIC: WOULD NEVER DOZE
HOW LIKELY ARE YOU TO NOD OFF OR FALL ASLEEP WHILE SITTING AND TALKING TO SOMEONE: WOULD NEVER DOZE
SLEEP_PROBLEM_INTERFERES_DAILY_ACTIVITIES: NOT AT ALL NOTICEABLE
HOW LIKELY ARE YOU TO NOD OFF OR FALL ASLEEP WHILE WATCHING TV: WOULD NEVER DOZE
DIFFICULTY_STAYING_ASLEEP: MILD
WORRIED_DISTRESSED_DUE_TO_SLEEP: NOT AT ALL NOTICEABLE
HOW LIKELY ARE YOU TO NOD OFF OR FALL ASLEEP WHILE SITTING AND READING: WOULD NEVER DOZE
SLEEP_PROBLEM_NOTICEABLE_TO_OTHERS: NOT AT ALL NOTICEABLE
SATISFACTION_WITH_CURRENT_SLEEP_PATTERN: VERY SATISFIED
ESS-CHAD TOTAL SCORE: 2
SITING INACTIVE IN A PUBLIC PLACE LIKE A CLASS ROOM OR A MOVIE THEATER: WOULD NEVER DOZE
HOW LIKELY ARE YOU TO NOD OFF OR FALL ASLEEP WHILE LYING DOWN TO REST IN THE AFTERNOON WHEN CIRCUMSTANCES PERMIT: MODERATE CHANCE OF DOZING
HOW LIKELY ARE YOU TO NOD OFF OR FALL ASLEEP WHEN YOU ARE A PASSENGER IN A CAR FOR AN HOUR WITHOUT A BREAK: WOULD NEVER DOZE

## 2024-02-14 ASSESSMENT — ANXIETY QUESTIONNAIRES
5. BEING SO RESTLESS THAT IT IS HARD TO SIT STILL: NOT AT ALL
3. WORRYING TOO MUCH ABOUT DIFFERENT THINGS: NOT AT ALL
7. FEELING AFRAID AS IF SOMETHING AWFUL MIGHT HAPPEN: NOT AT ALL
1. FEELING NERVOUS, ANXIOUS, OR ON EDGE: NOT AT ALL
6. BECOMING EASILY ANNOYED OR IRRITABLE: NOT AT ALL
GAD7 TOTAL SCORE: 0
4. TROUBLE RELAXING: NOT AT ALL
2. NOT BEING ABLE TO STOP OR CONTROL WORRYING: NOT AT ALL

## 2024-02-14 ASSESSMENT — PATIENT HEALTH QUESTIONNAIRE - PHQ9
2. FEELING DOWN, DEPRESSED OR HOPELESS: NOT AT ALL
1. LITTLE INTEREST OR PLEASURE IN DOING THINGS: NOT AT ALL
SUM OF ALL RESPONSES TO PHQ9 QUESTIONS 1 AND 2: 0

## 2024-02-14 NOTE — PATIENT INSTRUCTIONS
It was a pleasure meeting you today Joy L Pallant     CURRENT DME: [facility]    As we discussed today in clinic:    1. I ordered you a CPAP machine today. We discussed about 30-day mask guarantee and insurance requirement for PAP compliance.    2. Encouraged to lose weight.   3. Remember, don't drive when sleepy.        As a general guideline, please replace your: PAP cushions every 2-4 weeks, mask every 3-6 months, hose every 3-6 months, Filter (disposable) every 2-4 weeks; machine may need replacement in 5-10 years (once they stop working).       Please follow-up in 3 months    ALWAYS BRING YOUR CPAP / BIPAP WITH YOU TO EVERY APPOINTMENT!  THANKS    FOR QUESTIONS AND CONCERNS:   1. In case of problems with machine or mask interface, please contact your DME company first. DME is the company that provides you the machine and/or CPAP supplies. If [employer] if your DME, you can reach them at [phone] or [employer] ([employer]) [phone].  2. For SLEEP STUDY appointments, please call [phone] ([place]) or [phone] / [phone] ([facility]) or any other  Sleep Lab location please call [phone]  3. For MEDICAL QUESTIONS, MEDICATION REFILLS, or CLINIC APPOINTMENT SCHEDULING, please call [phone] and my practice lead Yari would gladly assist you with any concerns.   4. In the event that you are running more than 10 minutes late to your appointment or 5 minutes to virtual, I will kindly ask you to reschedule.    Here at [facility], we wish you a restful sleep!

## 2024-02-14 NOTE — PROGRESS NOTES
Patient: Joy L Pallant  : 1955 AGE: 69 y.o. SEX:female   MRN: [id number]   Provider: [facility]     Location [facility]   Service Date: 2024   PCP: Mary Jane Alcantar MD   Referred by:               [facility] [facility]  FOLLOW-UP VISIT NOTE        HISTORY OF PRESENT ILLNESS     Patient ID: Joy L Pallant is a 69 y.o. female who presents to a [facility] for follow-up review of sleep study results    Patient is here alone today.  The patient has pertinent hx of PHILLIP, sleep disturbance, fatigue, obesity, HTN, Afib, HLD, allergic rhinitis, cataract, vitamin D deficiency, and chronic pain,     Previous Visit's:  2023  Joy L Pallant is a 68 y.o. female with a pertinent hx of sleep disturbance, fatigue, obesity, HTN, Afib, HLD, allergic rhinitis, cataract, vitamin D deficiency, and chronic pain, who presents to a [facility] for a sleep medicine evaluation with concerns of evaluation for sleep apnea after new onset of Afib. Patient is accompanied by her sister Kaycee who added to history.     Patient reports she was recently (12/15) hospitalized for new onset Afib. Patient has a significant family hx of PHILLIP. Never had a sleep study in the past. She reports snoring, wakes up x2 per night for nocturia. Has daytime fatigue. She was sent by PCP & Cardiology to r/o PHILLIP as trigger of Afib.     I discussed the relationship of PHILLIP and Afib as well as the pathophysiology of PHILLIP. We discussed testing options for sleep apnea, HSAT vs in-lab PSG. HSAT is reasonable as patient likely has PHILLIP based on history and exam and does not have any of the following comorbidities: CHF, seizures, neuromuscular weakness, hypoventilation, or significant COPD.   We briefly discussed treatment option but will go over more after testing. She is aware of CPAP as several siblings have one.       Interval  History  Patient was last seen in 12/2023.     02/14/2023  Patient here today to review her sleep study results. I reviewed the sleep study results in depth today, including the difference between 3% vs 4% scoring guidelines. She currently falls under the 4% scoring guidelines which showed moderate PHILLIP at 22/hr with SpO2 isaias of 74%. I discussed with her treatment options including first line of PAP therapy. Alternative therapy for moderate to severe PHILLIP is Inspire, she does not qualify for Inspire currently d/t elevated BMI. She is familiar with PAP therapy as her siblings all have one. I will submit an order today through [facility] for new CPAP.   Weight remains stable.   BP is slightly elevated, denied any symptoms of elevated BP.   Negative screens.     SLEEP HISTORY     SLEEP STUDY HISTORY  HSAT - 1/22/2024  BMI - 44  REI3% - 36/hr  REI4% - 22/hr  RAFAL - 0.9/hr  SpO2 isaias - 74%       SLEEP-WAKE SCHEDULE  Bedtime: 10 - 11 pm daily  Subjective sleep latency: 15 mins  Difficulty falling asleep: No  Number of awakenings: x2 times per night spontaneously   Falls back asleep in 10 mins or less  Difficulty staying asleep: No  due to nocturia  Final wake time: 6 - 630 am daily  Out of bed time: 6 - 630 am daily  Shift work: denied  Naps: yes - 2 hrs between 2 - 4 pm  Feels rested after a nap: Yes   Average sleep duration (excluding naps): 9 - 10 hrs    SLEEP ENVIRONMENT  Sleep location: bed  Sleep status: sleeps alone  Preferred sleep position: back, stomach, and side  TV in bedroom:   Room is dark:  Yes  Room is quiet: Yes  Room is cool: Yes  Bed comfort: good    SLEEP HABITS   Activities before bedtime: TV  Activities in bed:   Clockwatching: No   Smoking: never  ETOH: denied  Marijuana: denied  Caffeine: occasionally  Sleep aids: denied    WEIGHT: stable    Claustrophobia: No     REVIEW OF SYSTEMS     REVIEW OF SYSTEMS  SLEEP ROS   Review of Systems  SLEEP ROS: snoring, tossing and turning, decreased concentration,  excessive daytime sleepiness, difficulty falling asleep once awakened, feels sleepy during the day      All other systems have been reviewed and are negative.      ALLERGIES     Allergies   Allergen Reactions    Penicillins Hives and Rash    Statins-Hmg-Coa Reductase Inhibitors Unknown       MEDICATIONS     Current Outpatient Medications   Medication Sig Dispense Refill    apixaban (Eliquis) 5 mg tablet Take 1 tablet (5 mg) by mouth every 12 hours. 60 tablet 0    atorvastatin (Lipitor) 40 mg tablet Take 1 tablet (40 mg) by mouth once daily. 30 tablet 0    cholecalciferol (Vitamin D-3) 125 MCG (5000 UT) capsule Take 1 capsule (125 mcg) by mouth. Take 1 capsule by mouth on Monday, Wednesday, and Friday.      flecainide (Tambocor) 50 mg tablet Take 4 tablets all at once as needed for palpitations (fast heart rate episode) 32 tablet 0    losartan-hydrochlorothiazide (Hyzaar) 100-25 mg tablet Take 1 tablet by mouth once daily.      metoprolol tartrate (Lopressor) 25 mg tablet Take 1 tablet (25 mg) by mouth 2 times a day. 60 tablet 0     No current facility-administered medications for this visit.       PAST HISTORY     PERTINENT PAST MEDICAL HISTORY: See HPI    PERTINENT PAST SURGICAL HISTORY for Sleep Medicine:  non-contributory    PERTINENT FAMILY HISTORY for Sleep Medicine:  sleep apnea- siblings    PERTINENT SOCIAL HISTORY:  She  reports that she has never smoked. She has never been exposed to tobacco smoke. She has never used smokeless tobacco. She reports that she does not drink alcohol and does not use drugs. She currently lives alone and retired    Active Problems, Allergy List, Medication List, and PMH/PSH/FH/Social Hx have been reviewed and reconciled in chart. No significant changes unless documented in the pertinent chart section. Updates made when necessary.     PHYSICAL EXAM     VITAL SIGNS: /76   Pulse 72   Wt 125 kg (275 lb)   SpO2 95%   BMI 44.41 kg/m²     CURRENT WEIGHT:   Vitals:    02/14/24  "1103   Weight: 125 kg (275 lb)      BMI: Body mass index is 44.41 kg/m².     PREVIOUS WEIGHTS:  Wt Readings from Last 3 Encounters:   03/11/24 125 kg (275 lb)   02/14/24 125 kg (275 lb)   01/29/24 125 kg (276 lb 6.4 oz)         Today ESS: 2  Today KUMAR: 1  Today JENISE-7: 0  Today PHQ-2: NEGATIVE SCREEN     PHYSICAL EXAMINATION  General appearance: awake alert in NAD  Affect: normal  Skin: no rash noted to face  HEENT: Nasal congestion absent  Teeth: normal dentition  Lungs: no cough.  Extr: moves all four extremities  Neuro: normal speech      RESULTS/DATA     No results found for: \"IRON\", \"TRANSFERRIN\", \"IRONSAT\", \"TIBC\", \"FERRITIN\"    Bicarbonate   Date Value Ref Range Status   03/11/2024 28 21 - 32 mmol/L Final       PAP Adherence  Not applicable    ASSESSMENT/PLAN     Assessment/Plan   Joy L Pallant is a 69 y.o. female presents today in [facility] with the following problems:    CURRENT DME: [facility]    OBSTRUCTIVE SLEEP APNEA, moderate (HSAT AHI: 22/hr)  - Patient's risk factors for PHILLIP: BMI, age, neck circumference, postmenopause, HTN, Afib, family history, and narrow crowded upper airway anatomy  - PHILLIP diagnosed by HSAT in 1/2024. Reviewed sleep studies today in clinic. See HPI.  - I recommend starting auto-CPAP 5 -15 cm H2O with EPR 3, heated humidification, heated tubings, and mask fitting session. Orders sent to [facility].   - Discussed about 30-day mask guarantee and insurance requirement for PAP compliance follow-up.   - Supportive management: Lose weight. Stay off your back when sleeping. Avoid smoking, alcohol, and sedating medications if applicable. Don't drive when sleepy.  - Discussed PHILLIP pathophysiology, diagnostic testing (HST vs PSG), cardiometabolic and neurocognitive sequelae of untreated PHILLIP, and treatment options (PAP therapy, oral appliance, surgery, hypoglossal nerve stimulator called INSPIRE, etc).    - I had face-to-face discussion with the patient about the need for PAP " therapy to treat sleep apnea. Patient agreed with the plan and verbalized understanding.      INADEQUATE POOR SLEEP HYGIENE / EXCESSIVE DAYTIME SLEEPINESS / FATIGUE + SLEEP DISTURBANCES  - due to combination of poor sleep hygiene, anxiety, untreated sleep apnea, chronic pain, and nocturia.  - discussed with patient good sleep hygiene  - will reevaluate after successful testing and treatment of PHILLIP  02/14/23  - no significant changes noted today  - will re-evaluate after starting PAP therapy    OBESITY  - BMI today Body mass index is 44.41 kg/m².   - no significant weight changes noted or reported  - Encouraged patient to lose weight with diet and exercise.   - Weight loss can help in the long term treatment of PHILLIP.  - Defer management to PCP    HYPERTENSION  - BP today 142/76  - well controlled with medication, denies any issues with management   - encouraged daily exercise with healthy diet for BP and PHILLIP management  - Defer management to PCP    ATRIAL FIBRILLATION  New onset 12/15/2023  - discuss relationship of PHILLIP and Afib in regards to treatment, this is a long-term treatment, verbalized understanding  - positive PHILLIP - plan to treat given new onset Afib  - on meds per Cardio  - Defer management to Cardio    DEPRESSION / ANXIETY screen  - NEGATIVE SCREEN today 03/12/24     SMOKING STATUS screen  - never a smoker      All of patient's questions were answered. She verbalizes understanding and agreement with my assessment and plan.

## 2024-03-11 ENCOUNTER — APPOINTMENT (OUTPATIENT)
Dept: CARDIOLOGY | Facility: HOSPITAL | Age: 69
End: 2024-03-11
Payer: MEDICARE

## 2024-03-11 ENCOUNTER — APPOINTMENT (OUTPATIENT)
Dept: RADIOLOGY | Facility: HOSPITAL | Age: 69
End: 2024-03-11
Payer: MEDICARE

## 2024-03-11 ENCOUNTER — HOSPITAL ENCOUNTER (EMERGENCY)
Facility: HOSPITAL | Age: 69
Discharge: HOME | End: 2024-03-11
Attending: EMERGENCY MEDICINE
Payer: MEDICARE

## 2024-03-11 VITALS
OXYGEN SATURATION: 97 % | DIASTOLIC BLOOD PRESSURE: 65 MMHG | HEART RATE: 70 BPM | SYSTOLIC BLOOD PRESSURE: 129 MMHG | BODY MASS INDEX: 44.2 KG/M2 | TEMPERATURE: 98.2 F | RESPIRATION RATE: 15 BRPM | HEIGHT: 66 IN | WEIGHT: 275 LBS

## 2024-03-11 DIAGNOSIS — I48.0 PAROXYSMAL ATRIAL FIBRILLATION (MULTI): Primary | ICD-10-CM

## 2024-03-11 LAB
ALBUMIN SERPL BCP-MCNC: 4.3 G/DL (ref 3.4–5)
ALP SERPL-CCNC: 112 U/L (ref 33–136)
ALT SERPL W P-5'-P-CCNC: 21 U/L (ref 7–45)
ANION GAP SERPL CALC-SCNC: 13 MMOL/L (ref 10–20)
APPEARANCE UR: CLEAR
AST SERPL W P-5'-P-CCNC: 21 U/L (ref 9–39)
BASOPHILS # BLD AUTO: 0.07 X10*3/UL (ref 0–0.1)
BASOPHILS NFR BLD AUTO: 0.6 %
BILIRUB SERPL-MCNC: 0.4 MG/DL (ref 0–1.2)
BILIRUB UR STRIP.AUTO-MCNC: NEGATIVE MG/DL
BUN SERPL-MCNC: 19 MG/DL (ref 6–23)
CALCIUM SERPL-MCNC: 9.7 MG/DL (ref 8.6–10.3)
CARDIAC TROPONIN I PNL SERPL HS: 11 NG/L (ref 0–13)
CARDIAC TROPONIN I PNL SERPL HS: 17 NG/L (ref 0–13)
CHLORIDE SERPL-SCNC: 100 MMOL/L (ref 98–107)
CO2 SERPL-SCNC: 28 MMOL/L (ref 21–32)
COLOR UR: COLORLESS
CREAT SERPL-MCNC: 0.78 MG/DL (ref 0.5–1.05)
EGFRCR SERPLBLD CKD-EPI 2021: 81 ML/MIN/1.73M*2
EOSINOPHIL # BLD AUTO: 0.33 X10*3/UL (ref 0–0.4)
EOSINOPHIL NFR BLD AUTO: 2.9 %
ERYTHROCYTE [DISTWIDTH] IN BLOOD BY AUTOMATED COUNT: 13.5 % (ref 11.5–14.5)
GLUCOSE SERPL-MCNC: 134 MG/DL (ref 74–99)
GLUCOSE UR STRIP.AUTO-MCNC: NEGATIVE MG/DL
HCT VFR BLD AUTO: 45.6 % (ref 36–46)
HGB BLD-MCNC: 15.5 G/DL (ref 12–16)
IMM GRANULOCYTES # BLD AUTO: 0.11 X10*3/UL (ref 0–0.5)
IMM GRANULOCYTES NFR BLD AUTO: 1 % (ref 0–0.9)
KETONES UR STRIP.AUTO-MCNC: NEGATIVE MG/DL
LEUKOCYTE ESTERASE UR QL STRIP.AUTO: NEGATIVE
LYMPHOCYTES # BLD AUTO: 2.88 X10*3/UL (ref 0.8–3)
LYMPHOCYTES NFR BLD AUTO: 25.5 %
MCH RBC QN AUTO: 30.2 PG (ref 26–34)
MCHC RBC AUTO-ENTMCNC: 34 G/DL (ref 32–36)
MCV RBC AUTO: 89 FL (ref 80–100)
MONOCYTES # BLD AUTO: 1.14 X10*3/UL (ref 0.05–0.8)
MONOCYTES NFR BLD AUTO: 10.1 %
NEUTROPHILS # BLD AUTO: 6.75 X10*3/UL (ref 1.6–5.5)
NEUTROPHILS NFR BLD AUTO: 59.9 %
NITRITE UR QL STRIP.AUTO: NEGATIVE
NRBC BLD-RTO: 0 /100 WBCS (ref 0–0)
PH UR STRIP.AUTO: 7 [PH]
PLATELET # BLD AUTO: 300 X10*3/UL (ref 150–450)
POTASSIUM SERPL-SCNC: 3.3 MMOL/L (ref 3.5–5.3)
PROT SERPL-MCNC: 7.8 G/DL (ref 6.4–8.2)
PROT UR STRIP.AUTO-MCNC: NEGATIVE MG/DL
RBC # BLD AUTO: 5.14 X10*6/UL (ref 4–5.2)
RBC # UR STRIP.AUTO: ABNORMAL /UL
RBC #/AREA URNS AUTO: NORMAL /HPF
SODIUM SERPL-SCNC: 138 MMOL/L (ref 136–145)
SP GR UR STRIP.AUTO: 1
UROBILINOGEN UR STRIP.AUTO-MCNC: <2 MG/DL
WBC # BLD AUTO: 11.3 X10*3/UL (ref 4.4–11.3)
WBC #/AREA URNS AUTO: NORMAL /HPF

## 2024-03-11 PROCEDURE — 71045 X-RAY EXAM CHEST 1 VIEW: CPT | Performed by: RADIOLOGY

## 2024-03-11 PROCEDURE — 71045 X-RAY EXAM CHEST 1 VIEW: CPT

## 2024-03-11 PROCEDURE — 85025 COMPLETE CBC W/AUTO DIFF WBC: CPT | Performed by: EMERGENCY MEDICINE

## 2024-03-11 PROCEDURE — 80053 COMPREHEN METABOLIC PANEL: CPT | Performed by: EMERGENCY MEDICINE

## 2024-03-11 PROCEDURE — 93005 ELECTROCARDIOGRAM TRACING: CPT

## 2024-03-11 PROCEDURE — 36415 COLL VENOUS BLD VENIPUNCTURE: CPT | Performed by: EMERGENCY MEDICINE

## 2024-03-11 PROCEDURE — 84484 ASSAY OF TROPONIN QUANT: CPT | Performed by: EMERGENCY MEDICINE

## 2024-03-11 PROCEDURE — 81001 URINALYSIS AUTO W/SCOPE: CPT | Performed by: EMERGENCY MEDICINE

## 2024-03-11 PROCEDURE — 99283 EMERGENCY DEPT VISIT LOW MDM: CPT | Mod: 25

## 2024-03-11 ASSESSMENT — COLUMBIA-SUICIDE SEVERITY RATING SCALE - C-SSRS
2. HAVE YOU ACTUALLY HAD ANY THOUGHTS OF KILLING YOURSELF?: NO
1. IN THE PAST MONTH, HAVE YOU WISHED YOU WERE DEAD OR WISHED YOU COULD GO TO SLEEP AND NOT WAKE UP?: NO
6. HAVE YOU EVER DONE ANYTHING, STARTED TO DO ANYTHING, OR PREPARED TO DO ANYTHING TO END YOUR LIFE?: NO

## 2024-03-11 ASSESSMENT — PAIN - FUNCTIONAL ASSESSMENT: PAIN_FUNCTIONAL_ASSESSMENT: 0-10

## 2024-03-12 PROBLEM — G47.9 SLEEP DISTURBANCE: Status: ACTIVE | Noted: 2024-03-12

## 2024-03-12 LAB
ATRIAL RATE: 88 BPM
P AXIS: 50 DEGREES
P OFFSET: 204 MS
P ONSET: 144 MS
PR INTERVAL: 158 MS
Q ONSET: 223 MS
QRS COUNT: 14 BEATS
QRS DURATION: 98 MS
QT INTERVAL: 352 MS
QTC CALCULATION(BAZETT): 425 MS
QTC FREDERICIA: 400 MS
R AXIS: -14 DEGREES
T AXIS: 85 DEGREES
T OFFSET: 399 MS
VENTRICULAR RATE: 88 BPM

## 2024-03-12 NOTE — ED NOTES
Pt to ED for heart palpitations, she states she ate dinner and then started feeling funny so she decided to take her b/p. She states she was unable to gt a b/p initially, she states that she had a difficult time getting her b/p but when she did is was 157/111 hr of137 at 1958, 157/96 with heart rate of 136at 2013, then on repeat it was 131/84 with hr of 131 at 2030, on arrival pt's heart rate was 88, she was hypertenive with b/p of 170/77. Pt states she took 4 tablets of Flecainide, 200mg as prescribed by Dr. Thrasher her cardiologist. Pt states she takes Eliquis 5mg bid, Atorvastatin 40mg pm, cholecalciferol 125mcg am, and Flecainide prn, losartan-hydrochlorothiazide 100-25mg in the pm, and metoprolol tartate 25 bid.      Angelica Hines RN  03/11/24 8801

## 2024-03-12 NOTE — ED PROVIDER NOTES
"HPI   Chief Complaint   Patient presents with    Palpitations     Pt here with c/o heart palpitations, pt was told when this happened to take 4 flecanide and go to ED if palpitations persisted.       Chief complaint: Palpitations    History of present illness: Patient is a 69-year-old female that has a history of A-fib, hypercholesteremia presenting to the emergency department with complaints of racing heartbeat.  According to the patient, prior to arrival in the emergency department she began to experience palpitations.  The patient states that she checked her heart rate and it was in the 130s.  The patient states that she has had symptoms like this in the past and was diagnosed with A-fib.  The patient currently takes Eliquis.  The patient states that after the palpitations were there for a while, the patient took 2 doses of flecainide p.o.  The patient states that this was prescribed to her by her cardiologist \"pill in a pocket\".  The patient states that as she arrived in the emergency department, she felt improvement of her symptoms.  The patient denies any chest pain with this.  She denies any shortness of breath.        History provided by:  Patient   used: No                        [place] Coma Scale Score: 15                     Patient History   Past Medical History:   Diagnosis Date    Cellulitis of right lower limb 11/07/2019    Cellulitis of leg, right    Hypertension     Malignant neoplasm of tongue, unspecified (CMS/HCC) 11/03/2022    Malignant neoplasm of tongue    Other abnormal glucose 11/17/2013    Abnormal glucose    Other specified health status 02/10/2014    ARB intolerance     Past Surgical History:   Procedure Laterality Date    OTHER SURGICAL HISTORY  01/25/2022    Tongue surgery    OTHER SURGICAL HISTORY  01/25/2022    Cataract surgery    OTHER SURGICAL HISTORY  01/21/2015    Excision, Lesion Floor Mouth    TONSILLECTOMY  04/14/2014    Tonsillectomy With Adenoidectomy "     Family History   Problem Relation Name Age of Onset    Heart attack Mother      Atrial fibrillation Mother      Atrial fibrillation Sister       Social History     Tobacco Use    Smoking status: Never     Passive exposure: Never    Smokeless tobacco: Never   Vaping Use    Vaping Use: Never used   Substance Use Topics    Alcohol use: Never    Drug use: Never       Physical Exam   ED Triage Vitals [03/11/24 2115]   Temperature Heart Rate Respirations BP   36.8 °C (98.2 °F) 78 16 171/77      Pulse Ox Temp Source Heart Rate Source Patient Position   95 % Temporal Monitor Lying      BP Location FiO2 (%)     Left arm --       Physical Exam  Constitutional:       Appearance: Normal appearance.   HENT:      Head: Normocephalic and atraumatic.      Right Ear: External ear normal.      Left Ear: External ear normal.      Nose: Nose normal.      Mouth/Throat:      Mouth: Mucous membranes are moist.   Eyes:      General: Lids are normal.      Extraocular Movements: Extraocular movements intact.      Pupils: Pupils are equal, round, and reactive to light.   Cardiovascular:      Rate and Rhythm: Normal rate and regular rhythm.      Heart sounds: Normal heart sounds.   Pulmonary:      Effort: Pulmonary effort is normal.      Breath sounds: Normal breath sounds.   Abdominal:      General: Abdomen is flat.      Palpations: Abdomen is soft.      Tenderness: There is no abdominal tenderness. There is no guarding or rebound.   Musculoskeletal:         General: No deformity. Normal range of motion.      Cervical back: Normal range of motion and neck supple.   Skin:     General: Skin is warm.      Capillary Refill: Capillary refill takes less than 2 seconds.      Coloration: Skin is not jaundiced.   Neurological:      General: No focal deficit present.      Mental Status: She is alert and oriented to person, place, and time.   Psychiatric:         Mood and Affect: Mood normal.         Behavior: Behavior normal.         ED Course &  [facility]   Diagnoses as of 03/16/24 1758   Paroxysmal atrial fibrillation (CMS/[facility])       Medical Decision Making  Medical decision making: Patient remained stable throughout her time in the emergency department.  CBC demonstrated no significant abnormalities.  The patient's Chem-7 and LFTs were all within normal limits urinalysis demonstrated no significant abnormalities troponin was 17.  The patient's chest x-ray demonstrated no significant acute abnormalities meanwhile the patient's EKG demonstrated a normal sinus rhythm with a rate of 88 bpm isoelectric ST segments narrow QRS complexes and a QTc of 425.    Patient presents to the emergency department with complaints of palpitations.  Workup was performed as above and demonstrated no significant abnormalities.  The patient was very reassured.  Appears that the patient was in A-fib with RVR prior to arrival in the emergency department but after having taken her flecainide, she is now in his normal sinus rhythm.  The patient is comfortable being discharged home she was instructed to return for any worsening symptoms or chest pain the patient was then discharged home in otherwise stable condition to follow-up with a cardiologist as needed as an outpatient.    Amount and/or Complexity of Data Reviewed  Labs: ordered. Decision-making details documented in ED Course.  Radiology: ordered. Decision-making details documented in ED Course.  ECG/medicine tests: ordered and independent interpretation performed. Decision-making details documented in ED Course.        Procedure  Procedures     Sudhakar Bullock MD  03/16/24 [id number]

## 2024-03-13 ENCOUNTER — OFFICE VISIT (OUTPATIENT)
Dept: PRIMARY CARE | Facility: CLINIC | Age: 69
End: 2024-03-13
Payer: MEDICARE

## 2024-03-13 VITALS
WEIGHT: 274 LBS | HEART RATE: 69 BPM | DIASTOLIC BLOOD PRESSURE: 76 MMHG | BODY MASS INDEX: 44.22 KG/M2 | SYSTOLIC BLOOD PRESSURE: 142 MMHG | OXYGEN SATURATION: 97 %

## 2024-03-13 DIAGNOSIS — E66.01 CLASS 3 SEVERE OBESITY DUE TO EXCESS CALORIES WITH SERIOUS COMORBIDITY AND BODY MASS INDEX (BMI) OF 40.0 TO 44.9 IN ADULT (MULTI): ICD-10-CM

## 2024-03-13 DIAGNOSIS — C02.9 MALIGNANT NEOPLASM OF TONGUE (MULTI): ICD-10-CM

## 2024-03-13 DIAGNOSIS — G47.33 OSA (OBSTRUCTIVE SLEEP APNEA): ICD-10-CM

## 2024-03-13 DIAGNOSIS — I10 HYPERTENSION, UNSPECIFIED TYPE: ICD-10-CM

## 2024-03-13 DIAGNOSIS — E78.00 HYPERCHOLESTEREMIA: ICD-10-CM

## 2024-03-13 DIAGNOSIS — E87.6 HYPOKALEMIA: ICD-10-CM

## 2024-03-13 DIAGNOSIS — R73.9 HYPERGLYCEMIA: ICD-10-CM

## 2024-03-13 DIAGNOSIS — I48.0 PAROXYSMAL ATRIAL FIBRILLATION (MULTI): Primary | ICD-10-CM

## 2024-03-13 PROCEDURE — 99214 OFFICE O/P EST MOD 30 MIN: CPT | Performed by: INTERNAL MEDICINE

## 2024-03-13 PROCEDURE — 1036F TOBACCO NON-USER: CPT | Performed by: INTERNAL MEDICINE

## 2024-03-13 PROCEDURE — 1160F RVW MEDS BY RX/DR IN RCRD: CPT | Performed by: INTERNAL MEDICINE

## 2024-03-13 PROCEDURE — 3078F DIAST BP <80 MM HG: CPT | Performed by: INTERNAL MEDICINE

## 2024-03-13 PROCEDURE — 3008F BODY MASS INDEX DOCD: CPT | Performed by: INTERNAL MEDICINE

## 2024-03-13 PROCEDURE — 3077F SYST BP >= 140 MM HG: CPT | Performed by: INTERNAL MEDICINE

## 2024-03-13 PROCEDURE — 1159F MED LIST DOCD IN RCRD: CPT | Performed by: INTERNAL MEDICINE

## 2024-03-13 RX ORDER — POTASSIUM CHLORIDE 20 MEQ/1
20 TABLET, EXTENDED RELEASE ORAL 2 TIMES DAILY
Qty: 60 TABLET | Refills: 0 | Status: SHIPPED | OUTPATIENT
Start: 2024-03-13 | End: 2024-04-30 | Stop reason: SDUPTHER

## 2024-03-13 NOTE — PROGRESS NOTES
Subjective   Patient ID: Joy L Pallant is a 69 y.o. female who presents for ER Follow-up (Atrial Fibrillation).  ER Follow-up    Post ER follow up    Dx  A Fib with  RVR  Converted with 4 flecainide  No dyspnea  Mild chest pressure, palpitations  Cardio following    Rec's rev'd  Potassium low  Start 20 meq bid  Check labs in 2 weeks     Possible PHILLIP  Sleep consult pending     Allergic rhinitis  better     Hypertension stable on therapy no side effects     Hyperglycemia on diet  HBA1C  6.1   10-23     History of tongue cancer stable ENT following     Hypercholesterolemia on rx no side effects     Osteopenia  /  Vitamin D deficiency on supplementation no side effects     Diet and exercise reviewed    Review of Systems   All other systems reviewed and are negative.      Objective   /76   Pulse 69   Wt 124 kg (274 lb)   SpO2 97%   BMI 44.22 kg/m²   Lab Results   Component Value Date    WBC 11.3 03/11/2024    HGB 15.5 03/11/2024    HCT 45.6 03/11/2024     03/11/2024    CHOL 226 (H) 10/04/2023    TRIG 123 10/04/2023    HDL 52.9 10/04/2023    ALT 21 03/11/2024    AST 21 03/11/2024     03/11/2024    K 3.3 (L) 03/11/2024     03/11/2024    CREATININE 0.78 03/11/2024    BUN 19 03/11/2024    CO2 28 03/11/2024    TSH 0.61 12/15/2023    INR 1.0 12/15/2023    HGBA1C 6.1 (H) 10/04/2023           Physical Exam  Vitals reviewed.   Constitutional:       Appearance: Normal appearance. She is obese.   HENT:      Head: Normocephalic and atraumatic.      Mouth/Throat:      Pharynx: No posterior oropharyngeal erythema.   Eyes:      General: No scleral icterus.     Conjunctiva/sclera: Conjunctivae normal.      Pupils: Pupils are equal, round, and reactive to light.   Cardiovascular:      Rate and Rhythm: Normal rate and regular rhythm.      Heart sounds: Normal heart sounds.   Pulmonary:      Effort: No respiratory distress.      Breath sounds: No wheezing.   Abdominal:      General: Abdomen is flat. Bowel  sounds are normal. There is no distension.      Palpations: Abdomen is soft. There is no mass.      Tenderness: There is no abdominal tenderness. There is no rebound.   Musculoskeletal:         General: Normal range of motion.      Cervical back: Normal range of motion and neck supple.   Skin:     General: Skin is warm and dry.   Neurological:      General: No focal deficit present.      Mental Status: She is alert and oriented to person, place, and time. Mental status is at baseline.   Psychiatric:         Mood and Affect: Mood normal.         Behavior: Behavior normal.         Thought Content: Thought content normal.         Judgment: Judgment normal.         Problem List Items Addressed This Visit             ICD-10-CM    Hyperglycemia R73.9    Hypertension I10    Hypokalemia E87.6    Relevant Medications    potassium chloride CR (Klor-Con M20) 20 mEq ER tablet    Other Relevant Orders    Basic metabolic panel    Malignant neoplasm of tongue (CMS/[facility]) C02.9    A-fib (CMS/[facility]) - Primary I48.91    Relevant Orders    Magnesium    PHILLIP (obstructive sleep apnea) G47.33     Other Visit Diagnoses         Codes    Hypercholesteremia     E78.00    Class 3 severe obesity due to excess calories with serious comorbidity and body mass index (BMI) of 40.0 to 44.9 in adult (CMS/[facility])     E66.01, Z68.41          Assessment/Plan     Post ER follow up    Dx  A Fib with  RVR  Converted with 4 flecainide  No dyspnea  Mild chest pressure, palpitations  Cardio following    Rec's rev'd  Potassium low  Start 20 meq bid  Check labs in 2 weeks     Possible PHILLIP  Sleep consult pending     Allergic rhinitis  better     Hypertension stable on therapy no side effects     Hyperglycemia on diet  HBA1C  6.1   10-23     History of tongue cancer stable ENT following     Hypercholesterolemia on rx no side effects     Osteopenia  /  Vitamin D deficiency on supplementation no side effects     Diet and exercise reviewed    Mammogram 3-23  Dexa  3-23  Colonoscopy pt declined    Follow up as scheduled

## 2024-03-26 ENCOUNTER — HOSPITAL ENCOUNTER (OUTPATIENT)
Dept: RADIOLOGY | Facility: HOSPITAL | Age: 69
Discharge: HOME | End: 2024-03-26
Payer: MEDICARE

## 2024-03-26 VITALS — BODY MASS INDEX: 44.22 KG/M2 | HEIGHT: 66 IN

## 2024-03-26 DIAGNOSIS — Z12.31 ENCOUNTER FOR SCREENING MAMMOGRAM FOR MALIGNANT NEOPLASM OF BREAST: ICD-10-CM

## 2024-03-26 PROCEDURE — 77067 SCR MAMMO BI INCL CAD: CPT | Performed by: RADIOLOGY

## 2024-03-26 PROCEDURE — 77063 BREAST TOMOSYNTHESIS BI: CPT | Performed by: RADIOLOGY

## 2024-03-26 PROCEDURE — 77067 SCR MAMMO BI INCL CAD: CPT

## 2024-03-28 NOTE — RESULT ENCOUNTER NOTE
Please call the patient regarding her abnormal result.  Pt needs to call radiology to schedule follow up imaging
Yes

## 2024-04-10 ENCOUNTER — APPOINTMENT (OUTPATIENT)
Dept: RADIOLOGY | Facility: HOSPITAL | Age: 69
End: 2024-04-10
Payer: MEDICARE

## 2024-04-10 ENCOUNTER — HOSPITAL ENCOUNTER (OUTPATIENT)
Dept: RADIOLOGY | Facility: HOSPITAL | Age: 69
Discharge: HOME | End: 2024-04-10
Payer: MEDICARE

## 2024-04-10 DIAGNOSIS — R92.8 ABNORMAL MAMMOGRAM: ICD-10-CM

## 2024-04-10 PROCEDURE — 77065 DX MAMMO INCL CAD UNI: CPT | Mod: RIGHT SIDE | Performed by: RADIOLOGY

## 2024-04-10 PROCEDURE — 77065 DX MAMMO INCL CAD UNI: CPT | Mod: RT

## 2024-04-16 ENCOUNTER — TELEPHONE (OUTPATIENT)
Dept: PRIMARY CARE | Facility: CLINIC | Age: 69
End: 2024-04-16
Payer: MEDICARE

## 2024-04-16 NOTE — TELEPHONE ENCOUNTER
Kaycee states that Ebony feels like she has walking pneumonia again or Bronchitis, she says that her chest feels heavy, she is burping a lot more than normal, she doesn't feel well.     She is asking for a chest X-ray for possible pneumonia or bronchitis.    Let us know how to proceed

## 2024-04-17 ENCOUNTER — OFFICE VISIT (OUTPATIENT)
Dept: PRIMARY CARE | Facility: CLINIC | Age: 69
End: 2024-04-17
Payer: MEDICARE

## 2024-04-17 ENCOUNTER — HOSPITAL ENCOUNTER (OUTPATIENT)
Dept: RADIOLOGY | Facility: CLINIC | Age: 69
Discharge: HOME | End: 2024-04-17
Payer: MEDICARE

## 2024-04-17 VITALS
SYSTOLIC BLOOD PRESSURE: 126 MMHG | DIASTOLIC BLOOD PRESSURE: 72 MMHG | BODY MASS INDEX: 44.58 KG/M2 | TEMPERATURE: 97.4 F | HEART RATE: 68 BPM | WEIGHT: 277.4 LBS | OXYGEN SATURATION: 98 % | HEIGHT: 66 IN

## 2024-04-17 DIAGNOSIS — G47.33 OSA (OBSTRUCTIVE SLEEP APNEA): ICD-10-CM

## 2024-04-17 DIAGNOSIS — I10 HYPERTENSION, UNSPECIFIED TYPE: ICD-10-CM

## 2024-04-17 DIAGNOSIS — I48.0 PAROXYSMAL ATRIAL FIBRILLATION (MULTI): ICD-10-CM

## 2024-04-17 DIAGNOSIS — R06.2 WHEEZING: Primary | ICD-10-CM

## 2024-04-17 DIAGNOSIS — E66.01 CLASS 3 SEVERE OBESITY DUE TO EXCESS CALORIES WITH SERIOUS COMORBIDITY AND BODY MASS INDEX (BMI) OF 40.0 TO 44.9 IN ADULT (MULTI): ICD-10-CM

## 2024-04-17 DIAGNOSIS — E87.6 HYPOKALEMIA: ICD-10-CM

## 2024-04-17 DIAGNOSIS — R06.2 WHEEZING: ICD-10-CM

## 2024-04-17 DIAGNOSIS — E78.00 HYPERCHOLESTEREMIA: ICD-10-CM

## 2024-04-17 PROCEDURE — 1159F MED LIST DOCD IN RCRD: CPT | Performed by: INTERNAL MEDICINE

## 2024-04-17 PROCEDURE — 71046 X-RAY EXAM CHEST 2 VIEWS: CPT | Performed by: RADIOLOGY

## 2024-04-17 PROCEDURE — 3078F DIAST BP <80 MM HG: CPT | Performed by: INTERNAL MEDICINE

## 2024-04-17 PROCEDURE — 1160F RVW MEDS BY RX/DR IN RCRD: CPT | Performed by: INTERNAL MEDICINE

## 2024-04-17 PROCEDURE — 71046 X-RAY EXAM CHEST 2 VIEWS: CPT

## 2024-04-17 PROCEDURE — 1036F TOBACCO NON-USER: CPT | Performed by: INTERNAL MEDICINE

## 2024-04-17 PROCEDURE — 3074F SYST BP LT 130 MM HG: CPT | Performed by: INTERNAL MEDICINE

## 2024-04-17 PROCEDURE — 3008F BODY MASS INDEX DOCD: CPT | Performed by: INTERNAL MEDICINE

## 2024-04-17 PROCEDURE — 99214 OFFICE O/P EST MOD 30 MIN: CPT | Performed by: INTERNAL MEDICINE

## 2024-04-17 PROCEDURE — 93000 ELECTROCARDIOGRAM COMPLETE: CPT | Performed by: INTERNAL MEDICINE

## 2024-04-17 RX ORDER — LOSARTAN POTASSIUM AND HYDROCHLOROTHIAZIDE 25; 100 MG/1; MG/1
1 TABLET ORAL DAILY
Qty: 30 TABLET | Refills: 0 | Status: SHIPPED | OUTPATIENT
Start: 2024-04-17 | End: 2024-04-30 | Stop reason: SDUPTHER

## 2024-04-17 RX ORDER — METOPROLOL TARTRATE 25 MG/1
25 TABLET, FILM COATED ORAL 2 TIMES DAILY
Qty: 60 TABLET | Refills: 0 | Status: SHIPPED | OUTPATIENT
Start: 2024-04-17 | End: 2024-04-30 | Stop reason: SDUPTHER

## 2024-04-17 RX ORDER — ATORVASTATIN CALCIUM 40 MG/1
40 TABLET, FILM COATED ORAL DAILY
Qty: 30 TABLET | Refills: 0 | Status: SHIPPED | OUTPATIENT
Start: 2024-04-17 | End: 2024-04-30 | Stop reason: SDUPTHER

## 2024-04-17 ASSESSMENT — PATIENT HEALTH QUESTIONNAIRE - PHQ9
1. LITTLE INTEREST OR PLEASURE IN DOING THINGS: NOT AT ALL
SUM OF ALL RESPONSES TO PHQ9 QUESTIONS 1 AND 2: 0
2. FEELING DOWN, DEPRESSED OR HOPELESS: NOT AT ALL

## 2024-04-17 ASSESSMENT — ENCOUNTER SYMPTOMS
OCCASIONAL FEELINGS OF UNSTEADINESS: 0
DEPRESSION: 0
LOSS OF SENSATION IN FEET: 0

## 2024-04-17 NOTE — PROGRESS NOTES
"Subjective   Patient ID: Joy L Pallant is a 69 y.o. female who presents for Follow-up (Worrying about where the water goes in c-pap machine, is she getting too much. She is prone to pneumonia. Has been on c-pap for a month  ), burping (For a bout 2 months happens after she has the a-fib), and blood work  (For potassium).  HPI    Same day sick    Sister / Kaycee present H&P    Wheezing in am x 2 weeks  No cough or dyspnea or fever    PHILLIP on CPAP  Sleep following    A Fib in sinus now  Converted with 4 flecainide x 3 times  Cardio following  EKG SR 63     Rec's rev'd  Potassium low  On 20 meq bid  Check labs in 2 weeks     Allergic rhinitis  better     Hypertension stable on therapy no side effects     Hyperglycemia on diet  HBA1C  6.1   10-23     History of tongue cancer stable ENT following     Hypercholesterolemia on rx no side effects     Osteopenia  /  Vitamin D deficiency on supplementation no side effects     Diet and exercise reviewed    Review of Systems   All other systems reviewed and are negative.      Objective   /72   Pulse 68   Temp 36.3 °C (97.4 °F)   Ht 1.676 m (5' 6\")   Wt 126 kg (277 lb 6.4 oz)   SpO2 98%   BMI 44.77 kg/m²   Lab Results   Component Value Date    WBC 11.3 03/11/2024    HGB 15.5 03/11/2024    HCT 45.6 03/11/2024     03/11/2024    CHOL 226 (H) 10/04/2023    TRIG 123 10/04/2023    HDL 52.9 10/04/2023    ALT 21 03/11/2024    AST 21 03/11/2024     03/11/2024    K 3.3 (L) 03/11/2024     03/11/2024    CREATININE 0.78 03/11/2024    BUN 19 03/11/2024    CO2 28 03/11/2024    TSH 0.61 12/15/2023    INR 1.0 12/15/2023    HGBA1C 6.1 (H) 10/04/2023           Physical Exam  Vitals reviewed.   Constitutional:       Appearance: Normal appearance. She is obese.   HENT:      Head: Normocephalic and atraumatic.      Mouth/Throat:      Pharynx: No posterior oropharyngeal erythema.   Eyes:      General: No scleral icterus.     Conjunctiva/sclera: Conjunctivae normal.      " Pupils: Pupils are equal, round, and reactive to light.   Cardiovascular:      Rate and Rhythm: Normal rate and regular rhythm.      Heart sounds: Normal heart sounds.   Pulmonary:      Effort: No respiratory distress.      Breath sounds: No wheezing.   Abdominal:      General: Abdomen is flat. Bowel sounds are normal. There is no distension.      Palpations: Abdomen is soft. There is no mass.      Tenderness: There is no abdominal tenderness. There is no rebound.   Musculoskeletal:         General: Normal range of motion.      Cervical back: Normal range of motion and neck supple.   Skin:     General: Skin is warm and dry.   Neurological:      General: No focal deficit present.      Mental Status: She is alert and oriented to person, place, and time. Mental status is at baseline.   Psychiatric:         Mood and Affect: Mood normal.         Behavior: Behavior normal.         Thought Content: Thought content normal.         Judgment: Judgment normal.         Problem List Items Addressed This Visit             ICD-10-CM    Hypertension I10    Relevant Medications    metoprolol tartrate (Lopressor) 25 mg tablet    losartan-hydrochlorothiazide (Hyzaar) 100-25 mg tablet    Other Relevant Orders    ECG 12 lead (Clinic Performed)    Hypokalemia E87.6    A-fib (Multi) I48.91    Relevant Medications    metoprolol tartrate (Lopressor) 25 mg tablet    PHILLIP (obstructive sleep apnea) G47.33     Other Visit Diagnoses         Codes    Wheezing    -  Primary R06.2    Relevant Orders    XR chest 2 views    ECG 12 lead (Clinic Performed)    Hypercholesteremia     E78.00    Relevant Medications    atorvastatin (Lipitor) 40 mg tablet    Class 3 severe obesity due to excess calories with serious comorbidity and body mass index (BMI) of 40.0 to 44.9 in adult (Multi)     E66.01, Z68.41          Assessment/Plan   Same day sick    Sister / Kaycee present H&P    Wheezing in am x 2 weeks  No cough or dyspnea or fever  Check chest xray     PHILLIP on  CPAP  Sleep following    A Fib in sinus now  Converted with 4 flecainide x 3 times  Cardio following  EKG SR 63     Rec's rev'd  Potassium low  On 20 meq bid  Check labs in 2 weeks     Allergic rhinitis  better     Hypertension stable on therapy no side effects     Hyperglycemia on diet  HBA1C  6.1   10-23     History of tongue cancer stable ENT following     Hypercholesterolemia on rx no side effects     Osteopenia  /  Vitamin D deficiency on supplementation no side effects     Diet and exercise reviewed    Mammogram 3-23  Dexa 3-23  Colonoscopy pt declined    Follow up as scheduled

## 2024-04-22 DIAGNOSIS — M85.859 OSTEOPENIA OF NECK OF FEMUR, UNSPECIFIED LATERALITY: ICD-10-CM

## 2024-04-22 RX ORDER — POTASSIUM CHLORIDE 20 MEQ/1
20 TABLET, EXTENDED RELEASE ORAL 2 TIMES DAILY
COMMUNITY
End: 2024-04-22 | Stop reason: SDUPTHER

## 2024-04-22 RX ORDER — POTASSIUM CHLORIDE 20 MEQ/1
20 TABLET, EXTENDED RELEASE ORAL 2 TIMES DAILY
Qty: 60 TABLET | Refills: 0 | Status: SHIPPED | OUTPATIENT
Start: 2024-04-22 | End: 2024-04-30 | Stop reason: SDUPTHER

## 2024-04-26 ENCOUNTER — LAB (OUTPATIENT)
Dept: LAB | Facility: LAB | Age: 69
End: 2024-04-26
Payer: MEDICARE

## 2024-04-26 DIAGNOSIS — E87.6 HYPOKALEMIA: ICD-10-CM

## 2024-04-26 DIAGNOSIS — I48.0 PAROXYSMAL ATRIAL FIBRILLATION (MULTI): ICD-10-CM

## 2024-04-26 LAB
ANION GAP SERPL CALC-SCNC: 12 MMOL/L (ref 10–20)
BUN SERPL-MCNC: 18 MG/DL (ref 6–23)
CALCIUM SERPL-MCNC: 9.1 MG/DL (ref 8.6–10.3)
CHLORIDE SERPL-SCNC: 102 MMOL/L (ref 98–107)
CO2 SERPL-SCNC: 28 MMOL/L (ref 21–32)
CREAT SERPL-MCNC: 0.7 MG/DL (ref 0.5–1.05)
EGFRCR SERPLBLD CKD-EPI 2021: >90 ML/MIN/1.73M*2
GLUCOSE SERPL-MCNC: 84 MG/DL (ref 74–99)
MAGNESIUM SERPL-MCNC: 2.04 MG/DL (ref 1.6–2.4)
POTASSIUM SERPL-SCNC: 3.6 MMOL/L (ref 3.5–5.3)
SODIUM SERPL-SCNC: 138 MMOL/L (ref 136–145)

## 2024-04-26 PROCEDURE — 36415 COLL VENOUS BLD VENIPUNCTURE: CPT

## 2024-04-30 ENCOUNTER — OFFICE VISIT (OUTPATIENT)
Dept: PRIMARY CARE | Facility: CLINIC | Age: 69
End: 2024-04-30
Payer: MEDICARE

## 2024-04-30 VITALS
DIASTOLIC BLOOD PRESSURE: 60 MMHG | BODY MASS INDEX: 44.77 KG/M2 | TEMPERATURE: 98.6 F | HEART RATE: 77 BPM | SYSTOLIC BLOOD PRESSURE: 110 MMHG | WEIGHT: 277.4 LBS | OXYGEN SATURATION: 95 %

## 2024-04-30 DIAGNOSIS — I48.0 PAROXYSMAL ATRIAL FIBRILLATION (MULTI): ICD-10-CM

## 2024-04-30 DIAGNOSIS — E78.00 HYPERCHOLESTEREMIA: ICD-10-CM

## 2024-04-30 DIAGNOSIS — E87.6 HYPOKALEMIA: ICD-10-CM

## 2024-04-30 DIAGNOSIS — I10 HYPERTENSION, UNSPECIFIED TYPE: ICD-10-CM

## 2024-04-30 DIAGNOSIS — R92.8 ABNORMAL MAMMOGRAM: Primary | ICD-10-CM

## 2024-04-30 DIAGNOSIS — M85.859 OSTEOPENIA OF NECK OF FEMUR, UNSPECIFIED LATERALITY: ICD-10-CM

## 2024-04-30 DIAGNOSIS — E66.01 CLASS 3 SEVERE OBESITY DUE TO EXCESS CALORIES WITH SERIOUS COMORBIDITY AND BODY MASS INDEX (BMI) OF 40.0 TO 44.9 IN ADULT (MULTI): ICD-10-CM

## 2024-04-30 DIAGNOSIS — C02.9 MALIGNANT NEOPLASM OF TONGUE (MULTI): ICD-10-CM

## 2024-04-30 PROCEDURE — 1159F MED LIST DOCD IN RCRD: CPT | Performed by: INTERNAL MEDICINE

## 2024-04-30 PROCEDURE — 99214 OFFICE O/P EST MOD 30 MIN: CPT | Performed by: INTERNAL MEDICINE

## 2024-04-30 PROCEDURE — 1036F TOBACCO NON-USER: CPT | Performed by: INTERNAL MEDICINE

## 2024-04-30 PROCEDURE — 3078F DIAST BP <80 MM HG: CPT | Performed by: INTERNAL MEDICINE

## 2024-04-30 PROCEDURE — 3074F SYST BP LT 130 MM HG: CPT | Performed by: INTERNAL MEDICINE

## 2024-04-30 PROCEDURE — 1160F RVW MEDS BY RX/DR IN RCRD: CPT | Performed by: INTERNAL MEDICINE

## 2024-04-30 PROCEDURE — 3008F BODY MASS INDEX DOCD: CPT | Performed by: INTERNAL MEDICINE

## 2024-04-30 RX ORDER — METOPROLOL TARTRATE 25 MG/1
25 TABLET, FILM COATED ORAL 2 TIMES DAILY
Qty: 60 TABLET | Refills: 2 | Status: SHIPPED | OUTPATIENT
Start: 2024-04-30 | End: 2024-07-29

## 2024-04-30 RX ORDER — LOSARTAN POTASSIUM AND HYDROCHLOROTHIAZIDE 25; 100 MG/1; MG/1
1 TABLET ORAL DAILY
Qty: 30 TABLET | Refills: 2 | Status: SHIPPED | OUTPATIENT
Start: 2024-04-30

## 2024-04-30 RX ORDER — POTASSIUM CHLORIDE 20 MEQ/1
20 TABLET, EXTENDED RELEASE ORAL 2 TIMES DAILY
Qty: 60 TABLET | Refills: 2 | Status: SHIPPED | OUTPATIENT
Start: 2024-04-30 | End: 2024-05-21 | Stop reason: SDUPTHER

## 2024-04-30 RX ORDER — ATORVASTATIN CALCIUM 40 MG/1
40 TABLET, FILM COATED ORAL DAILY
Qty: 30 TABLET | Refills: 2 | Status: SHIPPED | OUTPATIENT
Start: 2024-04-30

## 2024-04-30 NOTE — PROGRESS NOTES
Subjective   Patient ID: Joy L Pallant is a 69 y.o. female who presents for Follow-up (3 mth. Go over bw results. ).  HPI    Routine follow up    Labs rev'd    Wheezing resolved    Abnormal mammogram right  Recheck 9-24     PHILLIP on CPAP  Sleep following     A Fib in sinus now  Converted with 4 flecainide x 3 times earlier in month (none since last visit)  Cardio following  EKG SR 63    Potassium stable  On 20 meq bid     Allergic rhinitis  better     Hypertension stable on therapy no side effects     Hyperglycemia on diet  HBA1C  6.1   10-23     History of tongue cancer stable ENT following     Hypercholesterolemia on rx no side effects     Osteopenia  /  Vitamin D deficiency on supplementation no side effects     Diet and exercise reviewed    Review of Systems   All other systems reviewed and are negative.      Objective   /60   Pulse 77   Temp 37 °C (98.6 °F)   Wt 126 kg (277 lb 6.4 oz)   SpO2 95%   BMI 44.77 kg/m²   Lab Results   Component Value Date    WBC 11.3 03/11/2024    HGB 15.5 03/11/2024    HCT 45.6 03/11/2024     03/11/2024    CHOL 226 (H) 10/04/2023    TRIG 123 10/04/2023    HDL 52.9 10/04/2023    ALT 21 03/11/2024    AST 21 03/11/2024     04/26/2024    K 3.6 04/26/2024     04/26/2024    CREATININE 0.70 04/26/2024    BUN 18 04/26/2024    CO2 28 04/26/2024    TSH 0.61 12/15/2023    INR 1.0 12/15/2023    HGBA1C 6.1 (H) 10/04/2023           Physical Exam  Vitals reviewed.   Constitutional:       Appearance: Normal appearance. She is obese.   HENT:      Head: Normocephalic and atraumatic.      Mouth/Throat:      Pharynx: No posterior oropharyngeal erythema.   Eyes:      General: No scleral icterus.     Conjunctiva/sclera: Conjunctivae normal.      Pupils: Pupils are equal, round, and reactive to light.   Cardiovascular:      Rate and Rhythm: Normal rate and regular rhythm.      Heart sounds: Normal heart sounds.   Pulmonary:      Effort: No respiratory distress.      Breath  sounds: No wheezing.   Abdominal:      General: Abdomen is flat. Bowel sounds are normal. There is no distension.      Palpations: Abdomen is soft. There is no mass.      Tenderness: There is no abdominal tenderness. There is no rebound.   Musculoskeletal:         General: Normal range of motion.      Cervical back: Normal range of motion and neck supple.   Skin:     General: Skin is warm and dry.   Neurological:      General: No focal deficit present.      Mental Status: She is alert and oriented to person, place, and time. Mental status is at baseline.   Psychiatric:         Mood and Affect: Mood normal.         Behavior: Behavior normal.         Thought Content: Thought content normal.         Judgment: Judgment normal.         Problem List Items Addressed This Visit             ICD-10-CM    Abnormal mammogram - Primary R92.8    Relevant Orders    BI mammo right diagnostic tomosynthesis    Hypertension I10    Relevant Medications    losartan-hydrochlorothiazide (Hyzaar) 100-25 mg tablet    metoprolol tartrate (Lopressor) 25 mg tablet    Hypokalemia E87.6    Relevant Medications    potassium chloride CR 20 mEq ER tablet    Malignant neoplasm of tongue (Multi) C02.9    Osteopenia M85.80    A-fib (Multi) I48.91    Relevant Medications    metoprolol tartrate (Lopressor) 25 mg tablet     Other Visit Diagnoses         Codes    Hypercholesteremia     E78.00    Relevant Medications    atorvastatin (Lipitor) 40 mg tablet    Class 3 severe obesity due to excess calories with serious comorbidity and body mass index (BMI) of 40.0 to 44.9 in adult (Multi)     E66.01, Z68.41          Assessment/Plan     Labs rev'd    Wheezing resolved    Abnormal mammogram right  Recheck 9-24     PHILLIP on CPAP  Sleep following     A Fib in sinus now  Converted with 4 flecainide x 3 times earlier in month (none since last visit)  Cardio following  EKG SR 63    Potassium stable  On 20 meq bid     Allergic rhinitis  better     Hypertension stable  on therapy no side effects     Hyperglycemia on diet  HBA1C  6.1   10-23     History of tongue cancer stable ENT following     Hypercholesterolemia on rx no side effects     Osteopenia  /  Vitamin D deficiency on supplementation no side effects     Diet and exercise reviewed    Mammogram 3-24 recheck 9-24 right  Dexa 3-23  Colonoscopy pt declined    Follow up 3 months / medical physical

## 2024-05-21 DIAGNOSIS — E87.6 HYPOKALEMIA: ICD-10-CM

## 2024-05-21 RX ORDER — POTASSIUM CHLORIDE 20 MEQ/1
20 TABLET, EXTENDED RELEASE ORAL 2 TIMES DAILY
Qty: 60 TABLET | Refills: 1 | Status: SHIPPED | OUTPATIENT
Start: 2024-05-21

## 2024-06-05 ENCOUNTER — OFFICE VISIT (OUTPATIENT)
Dept: SLEEP MEDICINE | Facility: CLINIC | Age: 69
End: 2024-06-05
Payer: MEDICARE

## 2024-06-05 VITALS
SYSTOLIC BLOOD PRESSURE: 137 MMHG | OXYGEN SATURATION: 94 % | DIASTOLIC BLOOD PRESSURE: 88 MMHG | WEIGHT: 273.8 LBS | HEART RATE: 62 BPM | BODY MASS INDEX: 44.19 KG/M2

## 2024-06-05 DIAGNOSIS — E66.01 MORBID OBESITY (MULTI): ICD-10-CM

## 2024-06-05 DIAGNOSIS — R53.83 FATIGUE, UNSPECIFIED TYPE: ICD-10-CM

## 2024-06-05 DIAGNOSIS — I48.11 LONGSTANDING PERSISTENT ATRIAL FIBRILLATION (MULTI): ICD-10-CM

## 2024-06-05 DIAGNOSIS — G47.9 SLEEP DISTURBANCE: ICD-10-CM

## 2024-06-05 DIAGNOSIS — G47.33 OSA (OBSTRUCTIVE SLEEP APNEA): Primary | ICD-10-CM

## 2024-06-05 DIAGNOSIS — Z78.9 NEVER SMOKED TOBACCO: ICD-10-CM

## 2024-06-05 DIAGNOSIS — I10 PRIMARY HYPERTENSION: ICD-10-CM

## 2024-06-05 PROCEDURE — 1159F MED LIST DOCD IN RCRD: CPT

## 2024-06-05 PROCEDURE — G2211 COMPLEX E/M VISIT ADD ON: HCPCS

## 2024-06-05 PROCEDURE — 1036F TOBACCO NON-USER: CPT

## 2024-06-05 PROCEDURE — 3008F BODY MASS INDEX DOCD: CPT

## 2024-06-05 PROCEDURE — 99214 OFFICE O/P EST MOD 30 MIN: CPT

## 2024-06-05 PROCEDURE — 3075F SYST BP GE 130 - 139MM HG: CPT

## 2024-06-05 PROCEDURE — 3079F DIAST BP 80-89 MM HG: CPT

## 2024-06-05 ASSESSMENT — ENCOUNTER SYMPTOMS
SNORING: 0
EXCESSIVE DAYTIME SLEEPINESS: 0

## 2024-06-05 NOTE — PATIENT INSTRUCTIONS
It was a pleasure meeting you today Joy L Pallant     CURRENT DME: [facility]    As we discussed today in clinic:    1. Continue with your current CPAP setting.   2. Encouraged to lose weight.   3. Remember, don't drive when sleepy.   4. If you want to try heated tubing, notify the clinic and I will place an order       As a general guideline, please replace your: PAP cushions every 2-4 weeks, mask every 3-6 months, hose every 3-6 months, Filter (disposable) every 2-4 weeks; machine may need replacement in 5-10 years (once they stop working).     Please follow-up in 6 months    ALWAYS BRING YOUR CPAP / BIPAP WITH YOU TO EVERY APPOINTMENT!  THANKS    FOR QUESTIONS AND CONCERNS:   1. In case of problems with machine or mask interface, please contact your DME company first. DME is the company that provides you the machine and/or CPAP supplies. If [employer] if your DME, you can reach them at [phone] or [employer] ([employer]) [phone].  2. For SLEEP STUDY appointments, please call [phone] ([facility]) or [phone] / [phone] ([facility]) or any other  Sleep Lab location please call [phone]  3. For MEDICAL QUESTIONS, MEDICATION REFILLS, or CLINIC APPOINTMENT SCHEDULING, please call [phone] and my practice lead Yari would gladly assist you with any concerns.   4. In the event that you are running more than 10 minutes late to your appointment or 5 minutes to virtual, I will kindly ask you to reschedule.    Here at [facility], we wish you a restful sleep!

## 2024-06-05 NOTE — PROGRESS NOTES
Patient: Joy L Pallant  : 1955 AGE: 69 y.o. SEX:female   MRN: [id number]   Provider: [facility]     Location [facility]   Service Date: 2024     PCP: Mary Jane Alcantar MD   Referred by:               [facility]  FOLLOW-UP VISIT NOTE        HISTORY OF PRESENT ILLNESS     Patient ID: Joy L Pallant is a 69 y.o. female who presents to a [facility] for follow-up difficulty staying asleep and PHILLIP on PAP.    Patient is here alone today.  The patient has pertinent hx of PHILLIP, CPAP dependence, sleep disturbance, fatigue, morbid obesity, HTN, Afib, HLD, never smoker, allergic rhinitis, cataract, vitamin D deficiency, and chronic pain.    Previous Visit's:  2023  Patient here today to review her sleep study results. I reviewed the sleep study results in depth today, including the difference between 3% vs 4% scoring guidelines. She currently falls under the 4% scoring guidelines which showed moderate PHILLIP at 22/hr with SpO2 isaias of 74%. I discussed with her treatment options including first line of PAP therapy. Alternative therapy for moderate to severe PHILLIP is Inspire, she does not qualify for Inspire currently d/t elevated BMI. She is familiar with PAP therapy as her siblings all have one. I will submit an order today through [facility] for new CPAP.   Weight remains stable.   BP is slightly elevated, denied any symptoms of elevated BP.   Negative screens.     2023  Joy L Pallant is a 68 y.o. female with a pertinent hx of sleep disturbance, fatigue, obesity, HTN, Afib, HLD, allergic rhinitis, cataract, vitamin D deficiency, and chronic pain, who presents to a [facility] for a sleep medicine evaluation with concerns of evaluation for sleep apnea after new onset of Afib. Patient is accompanied by her sister Kaycee who added to history.     Patient reports she was recently  (12/15) hospitalized for new onset Afib. Patient has a significant family hx of PHILLIP. Never had a sleep study in the past. She reports snoring, wakes up x2 per night for nocturia. Has daytime fatigue. She was sent by PCP & Cardiology to r/o PHILLIP as trigger of Afib.     I discussed the relationship of PHILLIP and Afib as well as the pathophysiology of PHILLIP. We discussed testing options for sleep apnea, HSAT vs in-lab PSG. HSAT is reasonable as patient likely has PHILLIP based on history and exam and does not have any of the following comorbidities: CHF, seizures, neuromuscular weakness, hypoventilation, or significant COPD.   We briefly discussed treatment option but will go over more after testing. She is aware of CPAP as several siblings have one.       Interval History  Patient was last seen in 2/2024.     06/05/24   Patient here today for initial compliance check. She reports that she is doing well with CPAP machine. She did inquire about changing the temperature on the CPAP. She currently has non-heated tube, I discussed changing the tube but she is not interested at this time. She was also concerned with the humidity from the CPAP machine getting into her lungs and causing pneumonia, I discussed this would be unlikely. She reports using nightly, does have to stop on average twice in the night d/t water pill and getting up for the restroom. She usually has no issue falling back asleep at night. She reports getting between 6 - 7 hrs of sleep at night. She has noticed slight improvement in her quality of sleep. We discussed that we are treating sleep apnea for risk factors.   Weight is stable, not active in weight management.   BP is WNL, well controlled with medications.   Did not complete screening questionnaires today.       SLEEP HISTORY     SLEEP STUDY HISTORY  HSAT - 1/22/2024  BMI - 44  REI3% - 36/hr  REI4% - 22/hr  RAFAL - 0.9/hr  SpO2 isaias - 74%     SLEEP-WAKE SCHEDULE  Bedtime: 10 - 11 pm  Wake time: 6 am    SLEEP  HABITS   Smoking: never  ETOH: DENIED  Marijuana: DENIED  Caffeine: DENIED  Sleep aids: denied     WEIGHT: stable    REVIEW OF SYSTEMS     REVIEW OF SYSTEMS  SLEEP ROS   Review of Systems   Respiratory:  Negative for snoring.    Neurological:  Negative for excessive daytime sleepiness.       Psych Review of Symptoms:    Anxiety: Patient denied any symptoms.         Depressive Symptoms: Patient denied any symptoms.         Sleep Concerns:   Awakening from sleep. No excessive daytime sleepiness, no difficulty staying asleep, no restless sleep, no difficulty falling asleep and no snoring.             All other systems have been reviewed and are negative.      ALLERGIES     Allergies   Allergen Reactions    Penicillins Hives and Rash    Statins-Hmg-Coa Reductase Inhibitors Unknown       MEDICATIONS     Current Outpatient Medications   Medication Sig Dispense Refill    apixaban (Eliquis) 5 mg tablet Take 1 tablet (5 mg) by mouth every 12 hours. 60 tablet 0    atorvastatin (Lipitor) 40 mg tablet Take 1 tablet (40 mg) by mouth once daily. 30 tablet 2    cholecalciferol (Vitamin D-3) 125 MCG (5000 UT) capsule Take 1 capsule (125 mcg) by mouth. Take 1 capsule by mouth on Monday, Wednesday, and Friday.      flecainide (Tambocor) 50 mg tablet Take 4 tablets all at once as needed for palpitations (fast heart rate episode) 32 tablet 0    losartan-hydrochlorothiazide (Hyzaar) 100-25 mg tablet Take 1 tablet by mouth once daily. 30 tablet 2    metoprolol tartrate (Lopressor) 25 mg tablet Take 1 tablet (25 mg) by mouth 2 times a day. 60 tablet 2    potassium chloride CR 20 mEq ER tablet Take 1 tablet (20 mEq) by mouth 2 times a day. Do not crush or chew. 60 tablet 1     No current facility-administered medications for this visit.       PAST HISTORY     PERTINENT PAST MEDICAL HISTORY: See HPI    PERTINENT PAST SURGICAL HISTORY for Sleep Medicine:  non-contributory    PERTINENT FAMILY HISTORY for Sleep Medicine:  sleep apnea-  "siblings    PERTINENT SOCIAL HISTORY:  She  reports that she has never smoked. She has never been exposed to tobacco smoke. She has never used smokeless tobacco. She reports that she does not drink alcohol and does not use drugs. She currently lives alone and retired    Active Problems, Allergy List, Medication List, and PMH/PSH/FH/Social Hx have been reviewed and reconciled in chart. No significant changes unless documented in the pertinent chart section. Updates made when necessary.     PHYSICAL EXAM     VITAL SIGNS: /88   Pulse 62   Wt 124 kg (273 lb 12.8 oz)   SpO2 94%   BMI 44.19 kg/m²     CURRENT WEIGHT:   Vitals:    06/05/24 1104   Weight: 124 kg (273 lb 12.8 oz)      BMI: Body mass index is 44.19 kg/m².     PREVIOUS WEIGHTS:  Wt Readings from Last 3 Encounters:   06/05/24 124 kg (273 lb 12.8 oz)   04/30/24 126 kg (277 lb 6.4 oz)   04/17/24 126 kg (277 lb 6.4 oz)       Today ESS: did not complete  Today KUMAR: did not complete  Today JENISE-7: did not complete   Today PHQ-2: did not complete    PHYSICAL EXAMINATION  General appearance: awake alert in NAD  Affect: normal  Skin: no rash noted to face  HEENT: Nasal congestion absent  Teeth: normal dentition  Lungs: no cough.  Extr: moves all four extremities  Neuro: normal speech        RESULTS/DATA     No results found for: \"IRON\", \"TRANSFERRIN\", \"IRONSAT\", \"TIBC\", \"FERRITIN\"    Bicarbonate   Date Value Ref Range Status   04/26/2024 28 21 - 32 mmol/L Final       PAP Adherence  A PAP adherence download was obtained and data was reviewed personally today in clinic.      ASSESSMENT/PLAN     Assessment/Plan   Joy L Pallant is a 69 y.o. female presents today in [facility] with the following problems:    CURRENT DME: [facility]    OBSTRUCTIVE SLEEP APNEA, moderate (HSAT AHI: 22/hr)  currently on auto-CPAP 5 - 15 cmH20 EPR 3  Excellent compliance to PAP therapy, residual AHI at goal, and good control of PHILLIP symptoms  - Patient's risk " factors for PHILLIP: BMI, age, neck circumference, postmenopause, HTN, Afib, family history, and narrow crowded upper airway anatomy  - PHILLIP diagnosed by HSAT in 1/2024. Reviewed sleep studies previously in clinic. See HPI.  - Retrieved and personally reviewed recent PAP adherence download data today. See HPI.   - - used 30/30 days for an average of 5 hrs 42 mins with residual AHI of 0.9/hr  - Continue current PAP settings.   - she inquired about changing temperature on machine, she is not currently set-up with heated tubing, after discussion she is not interested at this time for heated tubing    INADEQUATE POOR SLEEP HYGIENE / FATIGUE + SLEEP DISTURBANCES  - due to combination of poor sleep hygiene, anxiety, untreated sleep apnea, chronic pain, and nocturia.  - discussed with patient good sleep hygiene  - will reevaluate after successful testing and treatment of PHILLIP  02/14/23  - no significant changes noted today  - will re-evaluate after starting PAP therapy  06/05/24  - reports slight improvement with sleep quality  - still continues to get up for the restroom at least twice per night d/t water pill taken at night    MORBID OBESITY  - BMI today Body mass index is 44.19 kg/m².   - no significant weight changes noted or reported  - Encouraged patient to lose weight with diet and exercise.   - Weight loss can help in the long term treatment of PHILLIP.  - defer management to PCP       HYPERTENSION  - BP today 137/88  - well controlled with medication, denies any issues with management    - encouraged daily exercise with healthy diet for BP and PHILLIP management  - Defer management to PCP    ATRIAL FIBRILLATION  New onset 12/15/2023  - discuss relationship of PHILLIP and Afib in regards to treatment, this is a long-term treatment, verbalized understanding  - positive PHILLIP - plan to treat given new onset Afib  - on meds per Cardio  - Defer management to Cardio  06/05/24  - no recent episodes  - encouraged patient to continue with  CPAP usage as we are treating PHILLIP for her risk factor of Afib    SMOKING STATUS screen  - never a smoker     All of patient's questions were answered. She verbalizes understanding and agreement with my assessment and plan.

## 2024-06-25 ENCOUNTER — TELEPHONE (OUTPATIENT)
Dept: SLEEP MEDICINE | Facility: CLINIC | Age: 69
End: 2024-06-25
Payer: MEDICARE

## 2024-07-17 ENCOUNTER — APPOINTMENT (OUTPATIENT)
Dept: PRIMARY CARE | Facility: CLINIC | Age: 69
End: 2024-07-17
Payer: MEDICARE

## 2024-07-17 VITALS
WEIGHT: 270.4 LBS | TEMPERATURE: 98.4 F | BODY MASS INDEX: 43.64 KG/M2 | OXYGEN SATURATION: 96 % | DIASTOLIC BLOOD PRESSURE: 60 MMHG | SYSTOLIC BLOOD PRESSURE: 110 MMHG | HEART RATE: 81 BPM

## 2024-07-17 DIAGNOSIS — I10 HYPERTENSION, UNSPECIFIED TYPE: ICD-10-CM

## 2024-07-17 DIAGNOSIS — C02.9 MALIGNANT NEOPLASM OF TONGUE (MULTI): ICD-10-CM

## 2024-07-17 DIAGNOSIS — E87.6 HYPOKALEMIA: ICD-10-CM

## 2024-07-17 DIAGNOSIS — Z00.00 ENCOUNTER FOR SUBSEQUENT ANNUAL WELLNESS VISIT (AWV) IN MEDICARE PATIENT: Primary | ICD-10-CM

## 2024-07-17 DIAGNOSIS — R92.8 ABNORMAL MAMMOGRAM: ICD-10-CM

## 2024-07-17 DIAGNOSIS — R73.9 HYPERGLYCEMIA: ICD-10-CM

## 2024-07-17 DIAGNOSIS — E55.9 VITAMIN D DEFICIENCY: ICD-10-CM

## 2024-07-17 DIAGNOSIS — I48.0 PAROXYSMAL ATRIAL FIBRILLATION (MULTI): ICD-10-CM

## 2024-07-17 DIAGNOSIS — E66.01 CLASS 3 SEVERE OBESITY DUE TO EXCESS CALORIES WITH SERIOUS COMORBIDITY AND BODY MASS INDEX (BMI) OF 40.0 TO 44.9 IN ADULT (MULTI): ICD-10-CM

## 2024-07-17 DIAGNOSIS — E78.00 HYPERCHOLESTEREMIA: ICD-10-CM

## 2024-07-17 DIAGNOSIS — G47.33 OSA ON CPAP: ICD-10-CM

## 2024-07-17 PROCEDURE — 1159F MED LIST DOCD IN RCRD: CPT | Performed by: INTERNAL MEDICINE

## 2024-07-17 PROCEDURE — 1160F RVW MEDS BY RX/DR IN RCRD: CPT | Performed by: INTERNAL MEDICINE

## 2024-07-17 PROCEDURE — 1036F TOBACCO NON-USER: CPT | Performed by: INTERNAL MEDICINE

## 2024-07-17 PROCEDURE — G0447 BEHAVIOR COUNSEL OBESITY 15M: HCPCS | Performed by: INTERNAL MEDICINE

## 2024-07-17 PROCEDURE — 3078F DIAST BP <80 MM HG: CPT | Performed by: INTERNAL MEDICINE

## 2024-07-17 PROCEDURE — 3074F SYST BP LT 130 MM HG: CPT | Performed by: INTERNAL MEDICINE

## 2024-07-17 PROCEDURE — 1124F ACP DISCUSS-NO DSCNMKR DOCD: CPT | Performed by: INTERNAL MEDICINE

## 2024-07-17 PROCEDURE — G0439 PPPS, SUBSEQ VISIT: HCPCS | Performed by: INTERNAL MEDICINE

## 2024-07-17 PROCEDURE — 1170F FXNL STATUS ASSESSED: CPT | Performed by: INTERNAL MEDICINE

## 2024-07-17 PROCEDURE — 99214 OFFICE O/P EST MOD 30 MIN: CPT | Performed by: INTERNAL MEDICINE

## 2024-07-17 RX ORDER — POTASSIUM CHLORIDE 20 MEQ/1
20 TABLET, EXTENDED RELEASE ORAL 2 TIMES DAILY
Qty: 180 TABLET | Refills: 0 | Status: SHIPPED | OUTPATIENT
Start: 2024-07-17

## 2024-07-17 ASSESSMENT — ACTIVITIES OF DAILY LIVING (ADL)
DOING_HOUSEWORK: INDEPENDENT
BATHING: INDEPENDENT
TAKING_MEDICATION: INDEPENDENT
MANAGING_FINANCES: INDEPENDENT
DRESSING: INDEPENDENT
GROCERY_SHOPPING: INDEPENDENT

## 2024-07-17 ASSESSMENT — ENCOUNTER SYMPTOMS
LOSS OF SENSATION IN FEET: 0
OCCASIONAL FEELINGS OF UNSTEADINESS: 1
DEPRESSION: 0

## 2024-07-17 NOTE — PROGRESS NOTES
" 33 year old, , 27w5d, presents for EOB visit.    Patient concerns: Feeling well overall. Does has some concerns with her anxiety- inquiring if it would be reasonable to increase zoloft dose. Discussed starting by increasing to 50mg- pt agrees with this poc. States that her mood is good overall but she tends to occasionally focus on certain things and then worry about them. Feels that she has support from family and friends. Notes that she is currently fixated on resting HIV and Hepatitis B- has not had any known exposure or new sexual partners. Requests these labs be added to EOB labs today for \"piece of mind.\" Not interested in counseling at this time. PHQ9=0, GAD7= 2    Reports that her yeast symptoms have improved- home remedies and has changed diet. No current s/s- notices she cannot have any sugar in her diet or symptoms will return.    Had growth ultrasound today, EFW 61%tile. Continue serial growth ultrasound d/t plaquenil. Has appts scheduled regularly with her provider, Dr. King.     Education completed today includes breast feeding, [facility] hand out , contraception, counting movements, signs of pre-term labor, when to present to birthplace, post partum depression, GBS, getting enough iron and labor induction.    Birth preferences reviewed: Medicated; desires epidural; open to unmedicated if shorter/faster labor  Had epidural with last 2. Tried nitrous oxide last time but didn't like it.   Would plan IOL at 41 weeks (was induced for late term last 2 pregnancies)    Labor support:  , possibly mom and sister   [place] Feeding plans : Breastfeeding; would like breastfeed longer than 6 months- desires 1 year   6 months with last 2 babies     Contraception planned: Undecided- need to discuss further    The following labs were ordered today:       GCT, CBC w platelets, Vitamin D and Anti-treponema  Water birth consent form was not given.    Blood type:   ABO   Date Value Ref Range Status " Subjective   Reason for Visit: Joy L Pallant is an 69 y.o. female here for a Medicare Wellness visit / follow up    Past Medical, Surgical, and Family History reviewed and updated in chart.    Reviewed all medications by prescribing practitioner or clinical pharmacist (such as prescriptions, OTCs, herbal therapies and supplements) and documented in the medical record.    HPI    Medicare wellness    Follow up    Feels well    Abnormal mammogram right  Recheck 9-24     PHILLIP on CPAP  Sleep following     A Fib in sinus now  Converted with 4 flecainide x 3 times earlier in month (none since last visit)  Cardio following     Potassium stable  On 20 meq bid     Allergic rhinitis  better     Hypertension stable on therapy no side effects     Hyperglycemia on diet  HBA1C  6.1   10-23     History of tongue cancer stable ENT following     Hypercholesterolemia on rx no side effects     Osteopenia  /  Vitamin D deficiency on supplementation no side effects     Diet and exercise reviewed     Mammogram 3-24 recheck 9-24 right  Dexa 3-23  Colonoscopy pt declined    Patient Care Team:  Mary Jane Alcantar MD as PCP - General  Mary Jane Alcantar MD as PCP - MSSP ACO Attributed Provider     Review of Systems   All other systems reviewed and are negative.      Objective   Vitals:  /60   Pulse 81   Temp 36.9 °C (98.4 °F)   Wt 123 kg (270 lb 6.4 oz)   SpO2 96%   BMI 43.64 kg/m²       Physical Exam  Vitals reviewed.   Constitutional:       Appearance: Normal appearance. She is obese.   HENT:      Head: Normocephalic and atraumatic.      Mouth/Throat:      Pharynx: No posterior oropharyngeal erythema.   Eyes:      General: No scleral icterus.     Conjunctiva/sclera: Conjunctivae normal.      Pupils: Pupils are equal, round, and reactive to light.   Cardiovascular:      Rate and Rhythm: Normal rate and regular rhythm.      Heart sounds: Normal heart sounds.   Pulmonary:      Effort: No respiratory distress.      Breath sounds:    09/28/2017 O  Final     RH(D)   Date Value Ref Range Status   09/28/2017 Pos  Final     Antibody Screen   Date Value Ref Range Status   09/28/2017 Neg  Final   Rhogam  was not given.    TDAP  Was given.    A/P:  Encounter Diagnosis   Name Primary?     Supervision of high-risk pregnancy of elderly multigravida, IVF, [facility] MD Yes     Orders Placed This Encounter   Procedures     TDAP VACCINE (BOOSTRIX)     Glucose 1 Hour     CBC with Platelets     Anti Treponema     25- [place]-Vitamin D     HIV Antigen Antibody Combo     Hepatitis B Surface Antibody     Hepatitis B Surface Antigen     Reviewed EOB education.  EOB labs ordered- 1 hour glucose, cbc with plts, anti-trep, vitamin d.  HIV and Hepatitis B antigen and antibody added to labs per pt request.  Tdap given today.  Reviewed today's growth ultrasound. Continue serial growth u/s d/t plaquenil.  Discussed anxiety symptoms, resources and current medication regimen. Pt desires to increase zoloft dose to 50mg from 25mg.  May continue to titrate as needed- follow up with clinic to discuss.  Plan 1-2 week postpartum mood check.  Continue scheduled prenatal care, RTC in approx 3-4 weeks for SOUMYA and Growth u/s.    HARVEY Adamson, RIANM                 No wheezing.   Abdominal:      General: Abdomen is flat. Bowel sounds are normal. There is no distension.      Palpations: Abdomen is soft. There is no mass.      Tenderness: There is no abdominal tenderness. There is no rebound.   Musculoskeletal:         General: Normal range of motion.      Cervical back: Normal range of motion and neck supple.   Skin:     General: Skin is warm and dry.   Neurological:      General: No focal deficit present.      Mental Status: She is alert and oriented to person, place, and time. Mental status is at baseline.   Psychiatric:         Mood and Affect: Mood normal.         Behavior: Behavior normal.         Thought Content: Thought content normal.         Judgment: Judgment normal.         Assessment/Plan   Problem List Items Addressed This Visit       Abnormal mammogram    Hyperglycemia    Relevant Orders    Hemoglobin A1C    Hypertension    Hypokalemia    Relevant Medications    potassium chloride CR 20 mEq ER tablet    Malignant neoplasm of tongue (Multi)    Relevant Orders    CBC and Auto Differential    Vitamin D deficiency    Relevant Orders    Vitamin D 25-Hydroxy,Total (for eval of Vitamin D levels)    A-fib (Multi)     Other Visit Diagnoses       Encounter for subsequent annual wellness visit (AWV) in Medicare patient    -  Primary    Hypercholesteremia        Relevant Orders    Comprehensive Metabolic Panel    Lipid Panel    TSH with reflex to Free T4 if abnormal    PHILLIP on CPAP        Class 3 severe obesity due to excess calories with serious comorbidity and body mass index (BMI) of 40.0 to 44.9 in adult (Multi)              Medicare wellness    Follow up    Feels well    Abnormal mammogram right  Recheck 9-24     PHILLIP on CPAP  Sleep following     A Fib in sinus now  Converted with 4 flecainide x 3 times earlier in month (none since last visit)  Cardio following     Potassium stable  On 20 meq bid     Allergic rhinitis  better     Hypertension stable on therapy no side  effects     Hyperglycemia on diet  HBA1C  6.1   10-23     History of tongue cancer stable ENT following     Hypercholesterolemia on rx no side effects     Osteopenia  /  Vitamin D deficiency on supplementation no side effects     Diet and exercise reviewed  I spent >15 minutes minutes face to face with individual  providing recommendations for nutrition choices and exercise plan to help achieve weight reduction.      Mammogram 3-24 recheck 9-24 right  Dexa 3-23  Colonoscopy pt declined  Immunization  RSV    Check labs before appt    Follow up 3 months

## 2024-07-26 DIAGNOSIS — E87.6 HYPOKALEMIA: ICD-10-CM

## 2024-07-28 RX ORDER — POTASSIUM CHLORIDE 20 MEQ/1
TABLET, EXTENDED RELEASE ORAL
Qty: 180 TABLET | Refills: 0 | Status: SHIPPED | OUTPATIENT
Start: 2024-07-28

## 2024-10-02 ENCOUNTER — LAB (OUTPATIENT)
Dept: LAB | Facility: LAB | Age: 69
End: 2024-10-02
Payer: MEDICARE

## 2024-10-02 DIAGNOSIS — C02.9 MALIGNANT NEOPLASM OF TONGUE (MULTI): ICD-10-CM

## 2024-10-02 DIAGNOSIS — E78.00 HYPERCHOLESTEREMIA: ICD-10-CM

## 2024-10-02 DIAGNOSIS — E55.9 VITAMIN D DEFICIENCY: ICD-10-CM

## 2024-10-02 DIAGNOSIS — R73.9 HYPERGLYCEMIA: ICD-10-CM

## 2024-10-02 LAB
25(OH)D3 SERPL-MCNC: 66 NG/ML (ref 30–100)
ALBUMIN SERPL BCP-MCNC: 4 G/DL (ref 3.4–5)
ALP SERPL-CCNC: 108 U/L (ref 33–136)
ALT SERPL W P-5'-P-CCNC: 21 U/L (ref 7–45)
ANION GAP SERPL CALC-SCNC: 9 MMOL/L (ref 10–20)
AST SERPL W P-5'-P-CCNC: 19 U/L (ref 9–39)
BASOPHILS # BLD AUTO: 0.06 X10*3/UL (ref 0–0.1)
BASOPHILS NFR BLD AUTO: 0.6 %
BILIRUB SERPL-MCNC: 0.7 MG/DL (ref 0–1.2)
BUN SERPL-MCNC: 17 MG/DL (ref 6–23)
CALCIUM SERPL-MCNC: 9 MG/DL (ref 8.6–10.3)
CHLORIDE SERPL-SCNC: 103 MMOL/L (ref 98–107)
CHOLEST SERPL-MCNC: 125 MG/DL (ref 0–199)
CHOLESTEROL/HDL RATIO: 2.6
CO2 SERPL-SCNC: 30 MMOL/L (ref 21–32)
CREAT SERPL-MCNC: 0.78 MG/DL (ref 0.5–1.05)
EGFRCR SERPLBLD CKD-EPI 2021: 81 ML/MIN/1.73M*2
EOSINOPHIL # BLD AUTO: 0.27 X10*3/UL (ref 0–0.4)
EOSINOPHIL NFR BLD AUTO: 2.8 %
ERYTHROCYTE [DISTWIDTH] IN BLOOD BY AUTOMATED COUNT: 13.7 % (ref 11.5–14.5)
EST. AVERAGE GLUCOSE BLD GHB EST-MCNC: 134 MG/DL
GLUCOSE SERPL-MCNC: 115 MG/DL (ref 74–99)
HBA1C MFR BLD: 6.3 %
HCT VFR BLD AUTO: 43.9 % (ref 36–46)
HDLC SERPL-MCNC: 47.3 MG/DL
HGB BLD-MCNC: 14 G/DL (ref 12–16)
IMM GRANULOCYTES # BLD AUTO: 0.05 X10*3/UL (ref 0–0.5)
IMM GRANULOCYTES NFR BLD AUTO: 0.5 % (ref 0–0.9)
LDLC SERPL CALC-MCNC: 63 MG/DL
LYMPHOCYTES # BLD AUTO: 1.53 X10*3/UL (ref 0.8–3)
LYMPHOCYTES NFR BLD AUTO: 16.1 %
MCH RBC QN AUTO: 29.5 PG (ref 26–34)
MCHC RBC AUTO-ENTMCNC: 31.9 G/DL (ref 32–36)
MCV RBC AUTO: 93 FL (ref 80–100)
MONOCYTES # BLD AUTO: 0.88 X10*3/UL (ref 0.05–0.8)
MONOCYTES NFR BLD AUTO: 9.3 %
NEUTROPHILS # BLD AUTO: 6.72 X10*3/UL (ref 1.6–5.5)
NEUTROPHILS NFR BLD AUTO: 70.7 %
NON HDL CHOLESTEROL: 78 MG/DL (ref 0–149)
NRBC BLD-RTO: 0 /100 WBCS (ref 0–0)
PLATELET # BLD AUTO: 272 X10*3/UL (ref 150–450)
POTASSIUM SERPL-SCNC: 3.8 MMOL/L (ref 3.5–5.3)
PROT SERPL-MCNC: 7 G/DL (ref 6.4–8.2)
RBC # BLD AUTO: 4.74 X10*6/UL (ref 4–5.2)
SODIUM SERPL-SCNC: 138 MMOL/L (ref 136–145)
TRIGL SERPL-MCNC: 76 MG/DL (ref 0–149)
TSH SERPL-ACNC: 1.1 MIU/L (ref 0.44–3.98)
VLDL: 15 MG/DL (ref 0–40)
WBC # BLD AUTO: 9.5 X10*3/UL (ref 4.4–11.3)

## 2024-10-02 PROCEDURE — 82306 VITAMIN D 25 HYDROXY: CPT

## 2024-10-02 PROCEDURE — 36415 COLL VENOUS BLD VENIPUNCTURE: CPT

## 2024-10-02 PROCEDURE — 83036 HEMOGLOBIN GLYCOSYLATED A1C: CPT

## 2024-10-09 ENCOUNTER — APPOINTMENT (OUTPATIENT)
Dept: PRIMARY CARE | Facility: CLINIC | Age: 69
End: 2024-10-09
Payer: MEDICARE

## 2024-10-09 VITALS
WEIGHT: 274 LBS | HEART RATE: 64 BPM | OXYGEN SATURATION: 98 % | SYSTOLIC BLOOD PRESSURE: 140 MMHG | TEMPERATURE: 97.3 F | BODY MASS INDEX: 44.22 KG/M2 | DIASTOLIC BLOOD PRESSURE: 72 MMHG

## 2024-10-09 DIAGNOSIS — I10 HYPERTENSION, UNSPECIFIED TYPE: ICD-10-CM

## 2024-10-09 DIAGNOSIS — Z23 IMMUNIZATION DUE: ICD-10-CM

## 2024-10-09 DIAGNOSIS — Z71.2 ENCOUNTER TO DISCUSS TEST RESULTS: Primary | ICD-10-CM

## 2024-10-09 DIAGNOSIS — I48.0 PAROXYSMAL ATRIAL FIBRILLATION (MULTI): ICD-10-CM

## 2024-10-09 DIAGNOSIS — G47.33 OSA ON CPAP: ICD-10-CM

## 2024-10-09 DIAGNOSIS — E55.9 VITAMIN D DEFICIENCY: ICD-10-CM

## 2024-10-09 DIAGNOSIS — R73.9 HYPERGLYCEMIA: ICD-10-CM

## 2024-10-09 DIAGNOSIS — E66.813 CLASS 3 SEVERE OBESITY DUE TO EXCESS CALORIES WITH SERIOUS COMORBIDITY AND BODY MASS INDEX (BMI) OF 40.0 TO 44.9 IN ADULT: ICD-10-CM

## 2024-10-09 DIAGNOSIS — E78.00 HYPERCHOLESTEREMIA: ICD-10-CM

## 2024-10-09 DIAGNOSIS — R92.8 ABNORMAL MAMMOGRAM: ICD-10-CM

## 2024-10-09 DIAGNOSIS — E87.6 HYPOKALEMIA: ICD-10-CM

## 2024-10-09 DIAGNOSIS — E66.01 CLASS 3 SEVERE OBESITY DUE TO EXCESS CALORIES WITH SERIOUS COMORBIDITY AND BODY MASS INDEX (BMI) OF 40.0 TO 44.9 IN ADULT: ICD-10-CM

## 2024-10-09 PROCEDURE — 1160F RVW MEDS BY RX/DR IN RCRD: CPT | Performed by: INTERNAL MEDICINE

## 2024-10-09 PROCEDURE — 90662 IIV NO PRSV INCREASED AG IM: CPT | Performed by: INTERNAL MEDICINE

## 2024-10-09 PROCEDURE — 3078F DIAST BP <80 MM HG: CPT | Performed by: INTERNAL MEDICINE

## 2024-10-09 PROCEDURE — 1036F TOBACCO NON-USER: CPT | Performed by: INTERNAL MEDICINE

## 2024-10-09 PROCEDURE — 99214 OFFICE O/P EST MOD 30 MIN: CPT | Performed by: INTERNAL MEDICINE

## 2024-10-09 PROCEDURE — G0008 ADMIN INFLUENZA VIRUS VAC: HCPCS | Performed by: INTERNAL MEDICINE

## 2024-10-09 PROCEDURE — 1159F MED LIST DOCD IN RCRD: CPT | Performed by: INTERNAL MEDICINE

## 2024-10-09 PROCEDURE — 3077F SYST BP >= 140 MM HG: CPT | Performed by: INTERNAL MEDICINE

## 2024-10-09 RX ORDER — METOPROLOL TARTRATE 25 MG/1
25 TABLET, FILM COATED ORAL 2 TIMES DAILY
Qty: 180 TABLET | Refills: 0 | Status: SHIPPED | OUTPATIENT
Start: 2024-10-09 | End: 2025-01-07

## 2024-10-09 RX ORDER — POTASSIUM CHLORIDE 20 MEQ/1
20 TABLET, EXTENDED RELEASE ORAL 2 TIMES DAILY
Qty: 180 TABLET | Refills: 0 | Status: SHIPPED | OUTPATIENT
Start: 2024-10-09 | End: 2025-01-07

## 2024-10-09 RX ORDER — ATORVASTATIN CALCIUM 40 MG/1
40 TABLET, FILM COATED ORAL DAILY
Qty: 90 TABLET | Refills: 0 | Status: SHIPPED | OUTPATIENT
Start: 2024-10-09

## 2024-10-09 RX ORDER — LOSARTAN POTASSIUM AND HYDROCHLOROTHIAZIDE 25; 100 MG/1; MG/1
1 TABLET ORAL DAILY
Qty: 90 TABLET | Refills: 0 | Status: SHIPPED | OUTPATIENT
Start: 2024-10-09

## 2024-10-09 NOTE — PROGRESS NOTES
Subjective   Patient ID: Joy L Pallant is a 69 y.o. female who presents for Follow-up (3 month ).  HPI    Routine follow up    Labs rev'd     Feels well     Abnormal mammogram right  Recheck 9-24     PHILLIP on CPAP  Sleep following     A Fib in sinus now  Converted with 4 flecainide x 3 times earlier in month (none since last visit)  Cardio following     Potassium stable  On 20 meq bid     Allergic rhinitis  better     Hypertension stable on therapy no side effects     Hyperglycemia on diet  HBA1C  6.3   10-24     History of tongue cancer stable ENT following     Hypercholesterolemia stable on rx no side effects     Osteopenia  /  Vitamin D deficiency on supplementation no side effects     Diet and exercise reviewed    Review of Systems   All other systems reviewed and are negative.      Objective   /72   Pulse 64   Temp 36.3 °C (97.3 °F)   Wt 124 kg (274 lb)   SpO2 98%   BMI 44.22 kg/m²   Lab Results   Component Value Date    WBC 9.5 10/02/2024    HGB 14.0 10/02/2024    HCT 43.9 10/02/2024     10/02/2024    CHOL 125 10/02/2024    TRIG 76 10/02/2024    HDL 47.3 10/02/2024    ALT 21 10/02/2024    AST 19 10/02/2024     10/02/2024    K 3.8 10/02/2024     10/02/2024    CREATININE 0.78 10/02/2024    BUN 17 10/02/2024    CO2 30 10/02/2024    TSH 1.10 10/02/2024    INR 1.0 12/15/2023    HGBA1C 6.3 (H) 10/02/2024           Physical Exam  Vitals reviewed.   Constitutional:       Appearance: Normal appearance. She is obese.   HENT:      Head: Normocephalic and atraumatic.      Mouth/Throat:      Pharynx: No posterior oropharyngeal erythema.   Eyes:      General: No scleral icterus.     Conjunctiva/sclera: Conjunctivae normal.      Pupils: Pupils are equal, round, and reactive to light.   Cardiovascular:      Rate and Rhythm: Normal rate and regular rhythm.      Heart sounds: Normal heart sounds.   Pulmonary:      Effort: No respiratory distress.      Breath sounds: No wheezing.   Abdominal:       General: Abdomen is flat. Bowel sounds are normal. There is no distension.      Palpations: Abdomen is soft. There is no mass.      Tenderness: There is no abdominal tenderness. There is no rebound.   Musculoskeletal:         General: Normal range of motion.      Cervical back: Normal range of motion and neck supple.   Skin:     General: Skin is warm and dry.   Neurological:      General: No focal deficit present.      Mental Status: She is alert and oriented to person, place, and time. Mental status is at baseline.   Psychiatric:         Mood and Affect: Mood normal.         Behavior: Behavior normal.         Thought Content: Thought content normal.         Judgment: Judgment normal.         Problem List Items Addressed This Visit             ICD-10-CM    Abnormal mammogram R92.8    Hyperglycemia R73.9    Hypertension I10    Relevant Medications    losartan-hydrochlorothiazide (Hyzaar) 100-25 mg tablet    metoprolol tartrate (Lopressor) 25 mg tablet    Hypokalemia E87.6    Relevant Medications    potassium chloride CR 20 mEq ER tablet    Vitamin D deficiency E55.9    A-fib (Multi) I48.91    Relevant Medications    metoprolol tartrate (Lopressor) 25 mg tablet     Other Visit Diagnoses         Codes    Encounter to discuss test results    -  Primary Z71.2    Hypercholesteremia     E78.00    Relevant Medications    atorvastatin (Lipitor) 40 mg tablet    PHILLIP on CPAP     G47.33    Immunization due     Z23    Relevant Orders    Flu vaccine, trivalent, preservative free, HIGH-DOSE, age 65y+ (Fluzone) (Completed)    Class 3 severe obesity due to excess calories with serious comorbidity and body mass index (BMI) of 40.0 to 44.9 in adult     E66.813, E66.01, Z68.41          Assessment/Plan     Labs rev'd     Feels well     Abnormal mammogram right  Recheck 9-24     PHILLIP on CPAP  Sleep following     A Fib in sinus now  Converted with 4 flecainide x 3 times earlier in month (none since last visit)  Cardio following      Potassium stable  On 20 meq bid     Allergic rhinitis  better     Hypertension stable on therapy no side effects     Hyperglycemia on diet  HBA1C  6.3   10-24     History of tongue cancer stable ENT following     Hypercholesterolemia stable on rx no side effects    Osteopenia  /  Vitamin D deficiency on supplementation no side effects     Diet and exercise reviewed    Mammogram 3-24 recheck 9-24 right  Dexa 3-23  Colonoscopy pt declined  Immunization  RSV    Flu shot given    Follow up 3 months

## 2024-11-01 ENCOUNTER — HOSPITAL ENCOUNTER (OUTPATIENT)
Dept: RADIOLOGY | Facility: HOSPITAL | Age: 69
Discharge: HOME | End: 2024-11-01
Payer: MEDICARE

## 2024-11-01 VITALS — BODY MASS INDEX: 43.39 KG/M2 | HEIGHT: 66 IN | WEIGHT: 270 LBS

## 2024-11-01 DIAGNOSIS — R92.8 ABNORMAL MAMMOGRAM: ICD-10-CM

## 2024-11-01 PROCEDURE — 77065 DX MAMMO INCL CAD UNI: CPT | Mod: RT

## 2024-12-05 ENCOUNTER — APPOINTMENT (OUTPATIENT)
Dept: SLEEP MEDICINE | Facility: CLINIC | Age: 69
End: 2024-12-05
Payer: MEDICARE

## 2024-12-09 ENCOUNTER — APPOINTMENT (OUTPATIENT)
Dept: SLEEP MEDICINE | Facility: CLINIC | Age: 69
End: 2024-12-09
Payer: MEDICARE

## 2024-12-09 VITALS
OXYGEN SATURATION: 96 % | DIASTOLIC BLOOD PRESSURE: 78 MMHG | SYSTOLIC BLOOD PRESSURE: 136 MMHG | HEART RATE: 66 BPM | WEIGHT: 271.8 LBS | HEIGHT: 66 IN | BODY MASS INDEX: 43.68 KG/M2

## 2024-12-09 DIAGNOSIS — G47.33 OSA (OBSTRUCTIVE SLEEP APNEA): Primary | ICD-10-CM

## 2024-12-09 PROCEDURE — 3075F SYST BP GE 130 - 139MM HG: CPT | Performed by: PSYCHIATRY & NEUROLOGY

## 2024-12-09 PROCEDURE — 99213 OFFICE O/P EST LOW 20 MIN: CPT | Performed by: PSYCHIATRY & NEUROLOGY

## 2024-12-09 PROCEDURE — 3008F BODY MASS INDEX DOCD: CPT | Performed by: PSYCHIATRY & NEUROLOGY

## 2024-12-09 PROCEDURE — 1159F MED LIST DOCD IN RCRD: CPT | Performed by: PSYCHIATRY & NEUROLOGY

## 2024-12-09 PROCEDURE — 3078F DIAST BP <80 MM HG: CPT | Performed by: PSYCHIATRY & NEUROLOGY

## 2024-12-09 PROCEDURE — G2211 COMPLEX E/M VISIT ADD ON: HCPCS | Performed by: PSYCHIATRY & NEUROLOGY

## 2024-12-09 PROCEDURE — 1160F RVW MEDS BY RX/DR IN RCRD: CPT | Performed by: PSYCHIATRY & NEUROLOGY

## 2024-12-09 PROCEDURE — 1036F TOBACCO NON-USER: CPT | Performed by: PSYCHIATRY & NEUROLOGY

## 2024-12-09 NOTE — PROGRESS NOTES
Patient: Joy L Pallant    [id number]  : 1955 -- AGE 69 y.o.    Provider: Angel James MD     [facility]   Service Date: 2024              [facility]  Follow-up Note          HPI: Joy L Pallant is a 69 y.o. female with PHILLIP on CPAP (diagnosed after having A-fib onset in ) with PMH notable for a-fib, HTN, morbid obesity, vitamin D deficiency, tongue cancer, and hyperlipidemia. She is here today for a follow up visit. She was last seen on 24 by my colleague, Marilou Candelario, FER-CNP.    Using CPAP nightly without issues. Trying to get at least 7 hours of sleep per night. Only issue is sometimes she loses power at home, such as during the summer - lost power for 5 days - house got hot and she was unable to use CPAP - had an episode of a-fib and needed to take her PRN flecainide.    Falls asleep easily with CPAP. Wakes up 2x/night to urinate (on hydrochlorothiazide), but easily returns to sleep when returning from using the restroom. Wakes up feeling alert.    No daytime sleepiness. No dozing in her chair in front of TV.    DME: [facility] in [place]  Setup date: 3/19/24  MASK  Type: Nasal mask  Fit: good    Patient is keeping the equipment clean and supplies are being renewed at appropriate intervals.     Prior Sleep studies:   HSAT - 2024: BMI - 44, PHILLIP - REI3% - 36/hr, REI4% - 22/hr, RAFAL - 0.9/hr. SpO2 isaias - 74%. There are periods of a few minutes of hypoxia to mid-high 80% occasionally in absence of respiratory events, but in the presence of flow limitation.                  REVIEW OF MACHINE DOWNLOAD:       Patient Active Problem List   Diagnosis    Abnormal mammogram    Acute sinusitis    Allergic rhinitis    Arthritis    Cellulitis of ankle    Obesity, Class III, BMI 40-49.9 (morbid obesity) (Multi)    Contact dermatitis    Cough    Fatigue    Hematuria    Hypercholesterolemia    Hyperglycemia    Hypertension    Hypokalemia    Malignant  neoplasm of tongue (Multi)    Osteopenia    Pneumonia    Post-menopausal osteoporosis    Right wrist pain    Vitamin D deficiency    Cataract, nuclear sclerotic senile, bilateral    Epiretinal membrane (ERM) of left eye    A-fib (Multi)    BMI 40.0-44.9, adult (Multi)    PHILLIP (obstructive sleep apnea)    Sleep disturbance    Morbid obesity (Multi)    Never smoked tobacco     Past Medical History:   Diagnosis Date    Cellulitis of right lower limb 11/07/2019    Cellulitis of leg, right    Hypertension     Malignant neoplasm of tongue, unspecified 11/03/2022    Malignant neoplasm of tongue    Other abnormal glucose 11/17/2013    Abnormal glucose    Other specified health status 02/10/2014    ARB intolerance     Past Surgical History:   Procedure Laterality Date    OTHER SURGICAL HISTORY  01/25/2022    Tongue surgery    OTHER SURGICAL HISTORY  01/25/2022    Cataract surgery    OTHER SURGICAL HISTORY  01/21/2015    Excision, Lesion Floor Mouth    TONSILLECTOMY  04/14/2014    Tonsillectomy With Adenoidectomy     Current Outpatient Medications on File Prior to Visit   Medication Sig Dispense Refill    apixaban (Eliquis) 5 mg tablet Take 1 tablet (5 mg) by mouth every 12 hours. 60 tablet 0    atorvastatin (Lipitor) 40 mg tablet Take 1 tablet (40 mg) by mouth once daily. 90 tablet 0    cholecalciferol (Vitamin D-3) 125 MCG (5000 UT) capsule Take 1 capsule (125 mcg) by mouth. Take 1 capsule by mouth on Monday, Wednesday, and Friday.      flecainide (Tambocor) 50 mg tablet Take 4 tablets all at once as needed for palpitations (fast heart rate episode) 32 tablet 0    losartan-hydrochlorothiazide (Hyzaar) 100-25 mg tablet Take 1 tablet by mouth once daily. 90 tablet 0    metoprolol tartrate (Lopressor) 25 mg tablet Take 1 tablet (25 mg) by mouth 2 times a day. 180 tablet 0    potassium chloride CR 20 mEq ER tablet Take 1 tablet (20 mEq) by mouth 2 times a day. Do not crush or chew. 180 tablet 0     No current  "facility-administered medications on file prior to visit.       PHYSICAL EXAMINATION:   Vitals:    12/09/24 1534   BP: 136/78   BP Location: Other (Comment)   BP Cuff Size: Adult   Pulse: 66   SpO2: 96%   Weight: 123 kg (271 lb 12.8 oz)   Height: 1.676 m (5' 6\")     Body mass index is 43.87 kg/m².  General: Awake. Alert. Comfortable. No apparent distress.   Speech: Normal.  Comprehension: Normal.  Mood: Stable.  Affect: Appropriate.  Pul:         Normal respiratory effort.   Abd:         Obese  Neuro: Alert, well-oriented. Cranial nerves II-XII grossly normal and symmetric.  Moves all limbs symmetrically with no evidence of significant focal weakness. No abnormal movements noted. Normal gait      ASSESSMENT AND PLAN: Ms. Joy L Pallant is a 69 y.o. female with PHILLIP on CPAP with PMH notable for A-fib and HTN, doing great on CPAP.  On her diagnostic sleep study, there were periods of a few minutes of hypoxia to mid-high 80% occasionally in absence of respiratory events, but in the presence of flow limitation. For peace of mind, I would like to check her overnight O2 levels with oximetry with CPAP.    #PHILLIP  -continue current CPAP settings  -praised pt for her success with CPAP thus far  -overnight oximetry on autoCPAP 5-15 cm H2O on room air         All of the above was discussed with the patient in detail. She voiced an understanding of the above and was agreeable to proceed further as advised.     28 minutes were spent with the patient plus time spent reviewing the chart, reviewing her raw sleep study data, updating the chart as needed, and documenting.     FOLLOW UP: 6 months, sooner if needed  "

## 2025-01-09 ENCOUNTER — APPOINTMENT (OUTPATIENT)
Dept: PRIMARY CARE | Facility: CLINIC | Age: 70
End: 2025-01-09
Payer: MEDICARE

## 2025-01-09 VITALS
HEART RATE: 59 BPM | DIASTOLIC BLOOD PRESSURE: 72 MMHG | BODY MASS INDEX: 44.74 KG/M2 | SYSTOLIC BLOOD PRESSURE: 130 MMHG | TEMPERATURE: 97.3 F | WEIGHT: 277.2 LBS | OXYGEN SATURATION: 96 %

## 2025-01-09 DIAGNOSIS — C02.9 MALIGNANT NEOPLASM OF TONGUE (MULTI): ICD-10-CM

## 2025-01-09 DIAGNOSIS — E66.01 CLASS 3 SEVERE OBESITY DUE TO EXCESS CALORIES WITH SERIOUS COMORBIDITY AND BODY MASS INDEX (BMI) OF 40.0 TO 44.9 IN ADULT: ICD-10-CM

## 2025-01-09 DIAGNOSIS — I50.32 CHRONIC DIASTOLIC (CONGESTIVE) HEART FAILURE: ICD-10-CM

## 2025-01-09 DIAGNOSIS — R92.8 ABNORMAL MAMMOGRAM: Primary | ICD-10-CM

## 2025-01-09 DIAGNOSIS — E87.6 HYPOKALEMIA: ICD-10-CM

## 2025-01-09 DIAGNOSIS — E78.00 HYPERCHOLESTEREMIA: ICD-10-CM

## 2025-01-09 DIAGNOSIS — G47.33 OSA ON CPAP: ICD-10-CM

## 2025-01-09 DIAGNOSIS — I10 HYPERTENSION, UNSPECIFIED TYPE: ICD-10-CM

## 2025-01-09 DIAGNOSIS — E66.813 CLASS 3 SEVERE OBESITY DUE TO EXCESS CALORIES WITH SERIOUS COMORBIDITY AND BODY MASS INDEX (BMI) OF 40.0 TO 44.9 IN ADULT: ICD-10-CM

## 2025-01-09 DIAGNOSIS — I48.0 PAROXYSMAL ATRIAL FIBRILLATION (MULTI): ICD-10-CM

## 2025-01-09 DIAGNOSIS — E55.9 VITAMIN D DEFICIENCY: ICD-10-CM

## 2025-01-09 DIAGNOSIS — R73.9 HYPERGLYCEMIA: ICD-10-CM

## 2025-01-09 PROCEDURE — G2211 COMPLEX E/M VISIT ADD ON: HCPCS | Performed by: INTERNAL MEDICINE

## 2025-01-09 PROCEDURE — 1036F TOBACCO NON-USER: CPT | Performed by: INTERNAL MEDICINE

## 2025-01-09 PROCEDURE — 3078F DIAST BP <80 MM HG: CPT | Performed by: INTERNAL MEDICINE

## 2025-01-09 PROCEDURE — 1159F MED LIST DOCD IN RCRD: CPT | Performed by: INTERNAL MEDICINE

## 2025-01-09 PROCEDURE — 3075F SYST BP GE 130 - 139MM HG: CPT | Performed by: INTERNAL MEDICINE

## 2025-01-09 PROCEDURE — 1160F RVW MEDS BY RX/DR IN RCRD: CPT | Performed by: INTERNAL MEDICINE

## 2025-01-09 PROCEDURE — 99214 OFFICE O/P EST MOD 30 MIN: CPT | Performed by: INTERNAL MEDICINE

## 2025-01-09 RX ORDER — POTASSIUM CHLORIDE 20 MEQ/1
20 TABLET, EXTENDED RELEASE ORAL 2 TIMES DAILY
Qty: 180 TABLET | Refills: 0 | Status: SHIPPED | OUTPATIENT
Start: 2025-01-09 | End: 2025-04-09

## 2025-01-09 NOTE — PROGRESS NOTES
Subjective   Patient ID: Joy L Pallant is a 69 y.o. female who presents for Follow-up (3 month ).  HPI    Routine follow up    Feels well     Abnormal mammogram right  Recheck 5-25     PHILLIP on CPAP  Sleep following     A Fib in sinus now  (none since last visit)  CHF stable  Cardio following     Potassium stable  On 20 meq bid     Allergic rhinitis  better     Hypertension stable on therapy no side effects     Hyperglycemia on diet  HBA1C  6.3   10-24     History of tongue cancer stable ENT following     Hypercholesterolemia stable on rx no side effects     Osteopenia  /  Vitamin D deficiency on supplementation no side effects     Diet and exercise reviewed    Review of Systems   All other systems reviewed and are negative.      Objective   /72   Pulse 59   Temp 36.3 °C (97.3 °F)   Wt 126 kg (277 lb 3.2 oz)   SpO2 96%   BMI 44.74 kg/m²   Lab Results   Component Value Date    WBC 9.5 10/02/2024    HGB 14.0 10/02/2024    HCT 43.9 10/02/2024     10/02/2024    CHOL 125 10/02/2024    TRIG 76 10/02/2024    HDL 47.3 10/02/2024    ALT 21 10/02/2024    AST 19 10/02/2024     10/02/2024    K 3.8 10/02/2024     10/02/2024    CREATININE 0.78 10/02/2024    BUN 17 10/02/2024    CO2 30 10/02/2024    TSH 1.10 10/02/2024    INR 1.0 12/15/2023    HGBA1C 6.3 (H) 10/02/2024           Physical Exam  Vitals reviewed.   Constitutional:       Appearance: Normal appearance. She is obese.   HENT:      Head: Normocephalic and atraumatic.      Mouth/Throat:      Pharynx: No posterior oropharyngeal erythema.   Eyes:      General: No scleral icterus.     Conjunctiva/sclera: Conjunctivae normal.      Pupils: Pupils are equal, round, and reactive to light.   Cardiovascular:      Rate and Rhythm: Normal rate and regular rhythm.      Heart sounds: Normal heart sounds.   Pulmonary:      Effort: No respiratory distress.      Breath sounds: No wheezing.   Abdominal:      General: Abdomen is flat. Bowel sounds are normal.  There is no distension.      Palpations: Abdomen is soft. There is no mass.      Tenderness: There is no abdominal tenderness. There is no rebound.   Musculoskeletal:         General: Normal range of motion.      Cervical back: Normal range of motion and neck supple.   Skin:     General: Skin is warm and dry.   Neurological:      General: No focal deficit present.      Mental Status: She is alert and oriented to person, place, and time. Mental status is at baseline.   Psychiatric:         Mood and Affect: Mood normal.         Behavior: Behavior normal.         Thought Content: Thought content normal.         Judgment: Judgment normal.         Problem List Items Addressed This Visit             ICD-10-CM    Abnormal mammogram - Primary R92.8    Relevant Orders    BI mammo bilateral diagnostic tomosynthesis    Hyperglycemia R73.9    Hypertension I10    Hypokalemia E87.6    Relevant Medications    potassium chloride CR 20 mEq ER tablet    Malignant neoplasm of tongue (Multi) C02.9    Vitamin D deficiency E55.9    A-fib (Multi) I48.91    Chronic diastolic (congestive) heart failure I50.32     Other Visit Diagnoses         Codes    PHILLIP on CPAP     G47.33    Hypercholesteremia     E78.00    Class 3 severe obesity due to excess calories with serious comorbidity and body mass index (BMI) of 40.0 to 44.9 in adult     E66.813, E66.01, Z68.41          Assessment/Plan       Feels well     Abnormal mammogram right  Recheck 5-25     PHILLIP on CPAP  Sleep following     A Fib in sinus now  (none since last visit)  CHF stable  Cardio following     Potassium stable  On 20 meq bid     Allergic rhinitis  better     Hypertension stable on therapy no side effects     Hyperglycemia on diet  HBA1C  6.3   10-24     History of tongue cancer stable ENT following     Hypercholesterolemia stable on rx no side effects     Osteopenia  /  Vitamin D deficiency on supplementation no side effects     Diet and exercise reviewed    Mammogram 3-24 recheck  5-25    Dexa 3-23  Colonoscopy pt declined  Immunization UTD    Follow up 3 months

## 2025-01-24 DIAGNOSIS — E87.6 HYPOKALEMIA: ICD-10-CM

## 2025-01-26 RX ORDER — POTASSIUM CHLORIDE 20 MEQ/1
TABLET, EXTENDED RELEASE ORAL
Qty: 180 TABLET | Refills: 0 | OUTPATIENT
Start: 2025-01-26

## 2025-04-09 ENCOUNTER — APPOINTMENT (OUTPATIENT)
Dept: PRIMARY CARE | Facility: CLINIC | Age: 70
End: 2025-04-09
Payer: MEDICARE

## 2025-04-09 VITALS
OXYGEN SATURATION: 97 % | HEART RATE: 63 BPM | TEMPERATURE: 97.6 F | WEIGHT: 283 LBS | SYSTOLIC BLOOD PRESSURE: 128 MMHG | BODY MASS INDEX: 45.68 KG/M2 | DIASTOLIC BLOOD PRESSURE: 68 MMHG

## 2025-04-09 DIAGNOSIS — R92.8 ABNORMAL MAMMOGRAM: Primary | ICD-10-CM

## 2025-04-09 DIAGNOSIS — E55.9 VITAMIN D DEFICIENCY: ICD-10-CM

## 2025-04-09 DIAGNOSIS — I10 HYPERTENSION, UNSPECIFIED TYPE: ICD-10-CM

## 2025-04-09 DIAGNOSIS — E87.6 HYPOKALEMIA: ICD-10-CM

## 2025-04-09 DIAGNOSIS — E66.01 CLASS 3 SEVERE OBESITY DUE TO EXCESS CALORIES WITH SERIOUS COMORBIDITY AND BODY MASS INDEX (BMI) OF 45.0 TO 49.9 IN ADULT: ICD-10-CM

## 2025-04-09 DIAGNOSIS — I50.32 CHRONIC DIASTOLIC (CONGESTIVE) HEART FAILURE: ICD-10-CM

## 2025-04-09 DIAGNOSIS — I48.0 PAROXYSMAL ATRIAL FIBRILLATION (MULTI): ICD-10-CM

## 2025-04-09 DIAGNOSIS — C02.9 MALIGNANT NEOPLASM OF TONGUE (MULTI): ICD-10-CM

## 2025-04-09 DIAGNOSIS — E66.813 CLASS 3 SEVERE OBESITY DUE TO EXCESS CALORIES WITH SERIOUS COMORBIDITY AND BODY MASS INDEX (BMI) OF 45.0 TO 49.9 IN ADULT: ICD-10-CM

## 2025-04-09 DIAGNOSIS — G47.33 OSA ON CPAP: ICD-10-CM

## 2025-04-09 DIAGNOSIS — R73.9 HYPERGLYCEMIA: ICD-10-CM

## 2025-04-09 DIAGNOSIS — E78.00 HYPERCHOLESTEREMIA: ICD-10-CM

## 2025-04-09 PROCEDURE — 1160F RVW MEDS BY RX/DR IN RCRD: CPT | Performed by: INTERNAL MEDICINE

## 2025-04-09 PROCEDURE — 3078F DIAST BP <80 MM HG: CPT | Performed by: INTERNAL MEDICINE

## 2025-04-09 PROCEDURE — G2211 COMPLEX E/M VISIT ADD ON: HCPCS | Performed by: INTERNAL MEDICINE

## 2025-04-09 PROCEDURE — 99214 OFFICE O/P EST MOD 30 MIN: CPT | Performed by: INTERNAL MEDICINE

## 2025-04-09 PROCEDURE — 1036F TOBACCO NON-USER: CPT | Performed by: INTERNAL MEDICINE

## 2025-04-09 PROCEDURE — 1159F MED LIST DOCD IN RCRD: CPT | Performed by: INTERNAL MEDICINE

## 2025-04-09 PROCEDURE — 3074F SYST BP LT 130 MM HG: CPT | Performed by: INTERNAL MEDICINE

## 2025-04-09 RX ORDER — METOPROLOL TARTRATE 25 MG/1
25 TABLET, FILM COATED ORAL 2 TIMES DAILY
Qty: 180 TABLET | Refills: 0 | Status: SHIPPED | OUTPATIENT
Start: 2025-04-09 | End: 2025-07-08

## 2025-04-09 RX ORDER — ATORVASTATIN CALCIUM 40 MG/1
40 TABLET, FILM COATED ORAL DAILY
Qty: 90 TABLET | Refills: 0 | Status: SHIPPED | OUTPATIENT
Start: 2025-04-09

## 2025-04-09 RX ORDER — LOSARTAN POTASSIUM AND HYDROCHLOROTHIAZIDE 25; 100 MG/1; MG/1
1 TABLET ORAL DAILY
Qty: 90 TABLET | Refills: 0 | Status: SHIPPED | OUTPATIENT
Start: 2025-04-09

## 2025-04-09 NOTE — PROGRESS NOTES
Subjective   Patient ID: Joy L Pallant is a 70 y.o. female who presents for Follow-up (3 month ).  HPI    Routine follow up    No complaints     Abnormal mammogram right  Recheck 5-25     PHILLIP on CPAP  Sleep following     A Fib in sinus now  (none since last visit)  CHF stable  Cardio following     Potassium stable  On 20 meq bid     Allergic rhinitis  better     Hypertension stable on therapy no side effects     Hyperglycemia on diet  HBA1C  6.3   10-24     History of tongue cancer stable ENT following     Hypercholesterolemia stable on rx no side effects     Osteopenia  /  Vitamin D deficiency on supplementation no side effects     Diet and exercise reviewed    Review of Systems   All other systems reviewed and are negative.      Objective   /68   Pulse 63   Temp 36.4 °C (97.6 °F)   Wt 128 kg (283 lb)   SpO2 97%   BMI 45.68 kg/m²   Lab Results   Component Value Date    WBC 9.5 10/02/2024    HGB 14.0 10/02/2024    HCT 43.9 10/02/2024     10/02/2024    CHOL 125 10/02/2024    TRIG 76 10/02/2024    HDL 47.3 10/02/2024    ALT 21 10/02/2024    AST 19 10/02/2024     10/02/2024    K 3.8 10/02/2024     10/02/2024    CREATININE 0.78 10/02/2024    BUN 17 10/02/2024    CO2 30 10/02/2024    TSH 1.10 10/02/2024    INR 1.0 12/15/2023    HGBA1C 6.3 (H) 10/02/2024           Physical Exam  Vitals reviewed.   Constitutional:       Appearance: Normal appearance. She is obese.   HENT:      Head: Normocephalic and atraumatic.      Mouth/Throat:      Pharynx: No posterior oropharyngeal erythema.   Eyes:      General: No scleral icterus.     Conjunctiva/sclera: Conjunctivae normal.      Pupils: Pupils are equal, round, and reactive to light.   Cardiovascular:      Rate and Rhythm: Normal rate and regular rhythm.      Heart sounds: Murmur heard.   Pulmonary:      Effort: No respiratory distress.      Breath sounds: No wheezing.   Abdominal:      General: Abdomen is flat. Bowel sounds are normal. There is no  distension.      Palpations: Abdomen is soft. There is no mass.      Tenderness: There is no abdominal tenderness. There is no rebound.   Musculoskeletal:         General: Normal range of motion.      Cervical back: Normal range of motion and neck supple.   Skin:     General: Skin is warm and dry.   Neurological:      General: No focal deficit present.      Mental Status: She is alert and oriented to person, place, and time. Mental status is at baseline.   Psychiatric:         Mood and Affect: Mood normal.         Behavior: Behavior normal.         Thought Content: Thought content normal.         Judgment: Judgment normal.         Problem List Items Addressed This Visit             ICD-10-CM    Abnormal mammogram - Primary R92.8    Hyperglycemia R73.9    Hypertension I10    Relevant Medications    losartan-hydrochlorothiazide (Hyzaar) 100-25 mg tablet    metoprolol tartrate (Lopressor) 25 mg tablet    Hypokalemia E87.6    Malignant neoplasm of tongue (Multi) C02.9    Vitamin D deficiency E55.9    A-fib (Multi) I48.91    Relevant Medications    apixaban (Eliquis) 5 mg tablet    metoprolol tartrate (Lopressor) 25 mg tablet    Chronic diastolic (congestive) heart failure I50.32    Relevant Medications    metoprolol tartrate (Lopressor) 25 mg tablet     Other Visit Diagnoses         Codes    Hypercholesteremia     E78.00    Relevant Medications    atorvastatin (Lipitor) 40 mg tablet    PHILLIP on CPAP     G47.33    Class 3 severe obesity due to excess calories with serious comorbidity and body mass index (BMI) of 45.0 to 49.9 in adult     E66.813, Z68.42, E66.01          Assessment/Plan     No complaints     Abnormal mammogram right  Recheck 5-25     PHILLIP on CPAP  Sleep following     A Fib in sinus now  (none since last visit)  CHF stable  Cardio following     Potassium stable  On 20 meq bid     Allergic rhinitis  better     Hypertension stable on therapy no side effects     Hyperglycemia on diet  HBA1C  6.3   10-24      History of tongue cancer stable ENT following     Hypercholesterolemia stable on rx no side effects     Osteopenia  /  Vitamin D deficiency on supplementation no side effects     Diet and exercise reviewed    Mammogram 3-24 recheck 5-25    Dexa 3-23  Colonoscopy pt declined  Immunization UTD  BMI 45.6    Follow up 3 months / medicare physical

## 2025-04-23 DIAGNOSIS — E87.6 HYPOKALEMIA: ICD-10-CM

## 2025-04-23 RX ORDER — POTASSIUM CHLORIDE 20 MEQ/1
TABLET, EXTENDED RELEASE ORAL
Qty: 180 TABLET | Refills: 0 | Status: SHIPPED | OUTPATIENT
Start: 2025-04-23

## 2025-05-12 ENCOUNTER — APPOINTMENT (OUTPATIENT)
Dept: SLEEP MEDICINE | Facility: CLINIC | Age: 70
End: 2025-05-12
Payer: MEDICARE

## 2025-05-12 ENCOUNTER — HOSPITAL ENCOUNTER (OUTPATIENT)
Dept: RADIOLOGY | Facility: HOSPITAL | Age: 70
Discharge: HOME | End: 2025-05-12
Payer: MEDICARE

## 2025-05-12 VITALS — HEIGHT: 66 IN | BODY MASS INDEX: 45.48 KG/M2 | WEIGHT: 283 LBS

## 2025-05-12 DIAGNOSIS — R92.8 ABNORMAL MAMMOGRAM: ICD-10-CM

## 2025-05-12 PROCEDURE — G0279 TOMOSYNTHESIS, MAMMO: HCPCS | Performed by: RADIOLOGY

## 2025-05-12 PROCEDURE — 77066 DX MAMMO INCL CAD BI: CPT

## 2025-05-12 PROCEDURE — 77066 DX MAMMO INCL CAD BI: CPT | Performed by: RADIOLOGY

## 2025-05-14 ENCOUNTER — APPOINTMENT (OUTPATIENT)
Dept: SLEEP MEDICINE | Facility: CLINIC | Age: 70
End: 2025-05-14
Payer: MEDICARE

## 2025-05-14 VITALS
WEIGHT: 281.4 LBS | OXYGEN SATURATION: 96 % | HEART RATE: 68 BPM | DIASTOLIC BLOOD PRESSURE: 70 MMHG | BODY MASS INDEX: 45.42 KG/M2 | SYSTOLIC BLOOD PRESSURE: 130 MMHG

## 2025-05-14 DIAGNOSIS — G47.33 OSA (OBSTRUCTIVE SLEEP APNEA): Primary | ICD-10-CM

## 2025-05-14 DIAGNOSIS — E66.01 MORBID OBESITY (MULTI): ICD-10-CM

## 2025-05-14 PROCEDURE — 99213 OFFICE O/P EST LOW 20 MIN: CPT | Performed by: PSYCHIATRY & NEUROLOGY

## 2025-05-14 PROCEDURE — 3078F DIAST BP <80 MM HG: CPT | Performed by: PSYCHIATRY & NEUROLOGY

## 2025-05-14 PROCEDURE — G2211 COMPLEX E/M VISIT ADD ON: HCPCS | Performed by: PSYCHIATRY & NEUROLOGY

## 2025-05-14 PROCEDURE — 3075F SYST BP GE 130 - 139MM HG: CPT | Performed by: PSYCHIATRY & NEUROLOGY

## 2025-05-14 PROCEDURE — 1159F MED LIST DOCD IN RCRD: CPT | Performed by: PSYCHIATRY & NEUROLOGY

## 2025-05-14 PROCEDURE — 1036F TOBACCO NON-USER: CPT | Performed by: PSYCHIATRY & NEUROLOGY

## 2025-05-14 PROCEDURE — 1160F RVW MEDS BY RX/DR IN RCRD: CPT | Performed by: PSYCHIATRY & NEUROLOGY

## 2025-05-14 NOTE — PROGRESS NOTES
Patient: Joy L Pallant    [id number]  : 1955 -- AGE 70 y.o.    Provider: Angel James MD     [facility]   Service Date: 2025              [facility]  Follow-up Note          HPI: Joy L Pallant is a 70 y.o. female with PHILLIP on CPAP (diagnosed after having A-fib onset in ) with PMH notable for a-fib, HTN, morbid obesity, vitamin D deficiency, tongue cancer, and hyperlipidemia. She is here today for follow up visit. Last seen on 24, at which time she was doing well with CPAP, and overnight oximetry on CPAP was ordered.    Using CPAP nightly. No issues using it. No pressure intolerance. No known snoring with CPAP.    Overnight oximetry was performed, but we did not receive the results.    Lives with her twin sister. They were taking care of their mother at home until she passed away in  at age of 89.    No a-fib recurrences.    No daytime sleepiness in general, but sometimes is tired after working in the yard and exercising, but she does not nap.    She has gained 10 lbs in the last 6 months. She is not keen on trying weight loss medication (eg. Semaglutide or tirzepatide) due to her sister's history of pancreas issues.    PAP DEVICE   DME: [facility] in [place]  Setup date: 3/19/24  Type: CPAP  Finding benefit: yes - feels better sleep quality and daytime energy, better attitude, falls asleep more easily    MASK  Type: Nasal mask  Fit: good  Patient is keeping the equipment clean and supplies are being renewed at appropriate intervals.     Prior Sleep studies:   HSAT - 2024: BMI - 44, PHILLIP - REI3% - 36/hr, REI4% - 22/hr, RAFAL - 0.9/hr. SpO2 isaias - 74%. There are periods of a few minutes of hypoxia to mid-high 80% occasionally in absence of respiratory events, but in the presence of flow limitation.                REVIEW OF MACHINE DOWNLOAD:   Date Range: 25-25  % Days used: 100%  % Days with Usage >= 4 hrs: 100%  Average usage days  used: 6 h 43 min   Settin-15 cmH2O, EPR 3  95th percentile pressure: 10.1 cmH2O, median pressure 7.3 cmH2O, max pressure 11.0 cmH2O  95th percentile leak: 22 LPM, median leak 5.4 LPM  Residual AHI: 0.6    Problem List[1]  Medical History[2]  Surgical History[3]  Medications Ordered Prior to Encounter[4]    PHYSICAL EXAMINATION:   Vitals:    25 1046   BP: 130/70   Pulse: 68   SpO2: 96%   Weight: 128 kg (281 lb 6.4 oz)     Body mass index is 45.42 kg/m².  General: Awake. Alert. Comfortable. No apparent distress.   Speech: Normal.  Comprehension: Normal.  Mood: Stable.  Affect: Appropriate.  Pul:         Normal respiratory effort.   Abd:         Obese  Neuro: Alert, well-oriented. Cranial nerves II-XII grossly normal and symmetric.  Moves all limbs symmetrically with no evidence of significant focal weakness. No abnormal movements noted. Normal gait      ASSESSMENT AND PLAN: Ms. Joy L Pallant is a 70 y.o. female with PHILLIP doing great on CPAP, with PMH notable for atrial fibrillation, HTN, and obesity.      #PHILLIP  -continue nightly CPAP at current settings  -will ask DME for her oximetry report from several months ago    #morbid obesity  -encouraged pt's weight loss efforts     All of the above was discussed with the patient in detail. She voiced an understanding of the above and was agreeable to proceed further as advised.     20 minutes were spent with the patient plus time spent reviewing the chart, updating the chart as needed, and documenting.     FOLLOW UP: 1 year, sooner if needed         [1]   Patient Active Problem List  Diagnosis    Abnormal mammogram    Acute sinusitis    Allergic rhinitis    Arthritis    Cellulitis of ankle    Obesity, Class III, BMI 40-49.9 (morbid obesity)    Contact dermatitis    Cough    Fatigue    Hematuria    Hypercholesterolemia    Hyperglycemia    Hypertension    Hypokalemia    Malignant neoplasm of tongue (Multi)    Osteopenia    Pneumonia    Post-menopausal osteoporosis     Right wrist pain    Vitamin D deficiency    Cataract, nuclear sclerotic senile, bilateral    Epiretinal membrane (ERM) of left eye    A-fib (Multi)    BMI 40.0-44.9, adult (Multi)    PHILLIP (obstructive sleep apnea)    Sleep disturbance    Morbid obesity (Multi)    Never smoked tobacco    Chronic diastolic (congestive) heart failure   [2]   Past Medical History:  Diagnosis Date    Cellulitis of right lower limb 11/07/2019    Cellulitis of leg, right    Hypertension     Malignant neoplasm of tongue, unspecified 11/03/2022    Malignant neoplasm of tongue    Other abnormal glucose 11/17/2013    Abnormal glucose    Other specified health status 02/10/2014    ARB intolerance   [3]   Past Surgical History:  Procedure Laterality Date    OTHER SURGICAL HISTORY  01/25/2022    Tongue surgery    OTHER SURGICAL HISTORY  01/25/2022    Cataract surgery    OTHER SURGICAL HISTORY  01/21/2015    Excision, Lesion Floor Mouth    TONSILLECTOMY  04/14/2014    Tonsillectomy With Adenoidectomy   [4]   Current Outpatient Medications on File Prior to Visit   Medication Sig Dispense Refill    apixaban (Eliquis) 5 mg tablet Take 1 tablet (5 mg) by mouth every 12 hours. 180 tablet 0    atorvastatin (Lipitor) 40 mg tablet Take 1 tablet (40 mg) by mouth once daily. 90 tablet 0    cholecalciferol (Vitamin D-3) 125 MCG (5000 UT) capsule Take 1 capsule (125 mcg) by mouth. Take 1 capsule by mouth on Monday, Wednesday, and Friday.      flecainide (Tambocor) 50 mg tablet Take 4 tablets all at once as needed for palpitations (fast heart rate episode) 32 tablet 0    losartan-hydrochlorothiazide (Hyzaar) 100-25 mg tablet Take 1 tablet by mouth once daily. 90 tablet 0    metoprolol tartrate (Lopressor) 25 mg tablet Take 1 tablet (25 mg) by mouth 2 times a day. 180 tablet 0    potassium chloride CR 20 mEq ER tablet TAKE ONE TABLET BY MOUTH TWO TIMES A DAY. do not crush or chew 180 tablet 0     No current facility-administered medications on file prior to  visit.

## 2025-05-27 DIAGNOSIS — G47.33 OSA (OBSTRUCTIVE SLEEP APNEA): Primary | ICD-10-CM

## 2025-05-29 ENCOUNTER — TELEPHONE (OUTPATIENT)
Dept: SLEEP MEDICINE | Facility: CLINIC | Age: 70
End: 2025-05-29
Payer: MEDICARE

## 2025-05-29 NOTE — TELEPHONE ENCOUNTER
"----- Message from Angel James sent at 5/27/2025  6:28 PM EDT -----  Regarding: please call pt to re-do overnight oximetry, but WITH CPAP  Hi Antonina,    Can you please call this pt to let her know that the overnight oxygen sensor test (\"oximetry\" study) that she did back in December was supposed to be done with her using CPAP, and that I ordered a new oximetry study for Number 100 to send her another sensor, and she is to sleep with it on her finger while using CPAP as usual.    Thank you, please let me know if either of you have questions.  "

## 2025-05-29 NOTE — TELEPHONE ENCOUNTER
Spoke to Ebony, explained to redo the Oximetry test, she stated that understood the instructions.

## 2025-07-15 DIAGNOSIS — I10 HYPERTENSION, UNSPECIFIED TYPE: ICD-10-CM

## 2025-07-15 DIAGNOSIS — I48.0 PAROXYSMAL ATRIAL FIBRILLATION (MULTI): ICD-10-CM

## 2025-07-15 DIAGNOSIS — E87.6 HYPOKALEMIA: ICD-10-CM

## 2025-07-15 DIAGNOSIS — E78.00 HYPERCHOLESTEREMIA: ICD-10-CM

## 2025-07-16 RX ORDER — LOSARTAN POTASSIUM AND HYDROCHLOROTHIAZIDE 25; 100 MG/1; MG/1
1 TABLET ORAL DAILY
Qty: 90 TABLET | Refills: 0 | Status: SHIPPED | OUTPATIENT
Start: 2025-07-16

## 2025-07-16 RX ORDER — APIXABAN 5 MG/1
5 TABLET, FILM COATED ORAL EVERY 12 HOURS
Qty: 180 TABLET | Refills: 0 | Status: SHIPPED | OUTPATIENT
Start: 2025-07-16

## 2025-07-16 RX ORDER — ATORVASTATIN CALCIUM 40 MG/1
40 TABLET, FILM COATED ORAL DAILY
Qty: 90 TABLET | Refills: 0 | Status: SHIPPED | OUTPATIENT
Start: 2025-07-16

## 2025-07-16 RX ORDER — METOPROLOL TARTRATE 25 MG/1
25 TABLET, FILM COATED ORAL 2 TIMES DAILY
Qty: 180 TABLET | Refills: 0 | Status: SHIPPED | OUTPATIENT
Start: 2025-07-16

## 2025-07-16 RX ORDER — POTASSIUM CHLORIDE 20 MEQ/1
TABLET, EXTENDED RELEASE ORAL
Qty: 180 TABLET | Refills: 0 | Status: SHIPPED | OUTPATIENT
Start: 2025-07-16

## 2025-07-18 ENCOUNTER — TELEPHONE (OUTPATIENT)
Dept: SLEEP MEDICINE | Facility: CLINIC | Age: 70
End: 2025-07-18

## 2025-07-23 ENCOUNTER — APPOINTMENT (OUTPATIENT)
Dept: PRIMARY CARE | Facility: CLINIC | Age: 70
End: 2025-07-23
Payer: MEDICARE

## 2025-07-23 VITALS
SYSTOLIC BLOOD PRESSURE: 142 MMHG | WEIGHT: 278 LBS | HEIGHT: 66 IN | BODY MASS INDEX: 44.68 KG/M2 | DIASTOLIC BLOOD PRESSURE: 66 MMHG | OXYGEN SATURATION: 99 % | TEMPERATURE: 97.2 F | HEART RATE: 62 BPM

## 2025-07-23 DIAGNOSIS — E55.9 VITAMIN D DEFICIENCY: ICD-10-CM

## 2025-07-23 DIAGNOSIS — I10 HYPERTENSION, UNSPECIFIED TYPE: ICD-10-CM

## 2025-07-23 DIAGNOSIS — I48.11 LONGSTANDING PERSISTENT ATRIAL FIBRILLATION (MULTI): ICD-10-CM

## 2025-07-23 DIAGNOSIS — G47.33 OSA ON CPAP: ICD-10-CM

## 2025-07-23 DIAGNOSIS — E78.00 HYPERCHOLESTEREMIA: ICD-10-CM

## 2025-07-23 DIAGNOSIS — C02.9 MALIGNANT NEOPLASM OF TONGUE (MULTI): ICD-10-CM

## 2025-07-23 DIAGNOSIS — R73.9 HYPERGLYCEMIA: ICD-10-CM

## 2025-07-23 DIAGNOSIS — E66.813 CLASS 3 SEVERE OBESITY DUE TO EXCESS CALORIES WITH SERIOUS COMORBIDITY AND BODY MASS INDEX (BMI) OF 40.0 TO 44.9 IN ADULT: ICD-10-CM

## 2025-07-23 DIAGNOSIS — Z00.00 ENCOUNTER FOR SUBSEQUENT ANNUAL WELLNESS VISIT (AWV) IN MEDICARE PATIENT: Primary | ICD-10-CM

## 2025-07-23 DIAGNOSIS — E87.6 HYPOKALEMIA: ICD-10-CM

## 2025-07-23 PROCEDURE — 1159F MED LIST DOCD IN RCRD: CPT | Performed by: INTERNAL MEDICINE

## 2025-07-23 PROCEDURE — 1036F TOBACCO NON-USER: CPT | Performed by: INTERNAL MEDICINE

## 2025-07-23 PROCEDURE — G0439 PPPS, SUBSEQ VISIT: HCPCS | Performed by: INTERNAL MEDICINE

## 2025-07-23 PROCEDURE — 3008F BODY MASS INDEX DOCD: CPT | Performed by: INTERNAL MEDICINE

## 2025-07-23 PROCEDURE — 3078F DIAST BP <80 MM HG: CPT | Performed by: INTERNAL MEDICINE

## 2025-07-23 PROCEDURE — 1160F RVW MEDS BY RX/DR IN RCRD: CPT | Performed by: INTERNAL MEDICINE

## 2025-07-23 PROCEDURE — 1170F FXNL STATUS ASSESSED: CPT | Performed by: INTERNAL MEDICINE

## 2025-07-23 PROCEDURE — 3077F SYST BP >= 140 MM HG: CPT | Performed by: INTERNAL MEDICINE

## 2025-07-23 PROCEDURE — 99214 OFFICE O/P EST MOD 30 MIN: CPT | Performed by: INTERNAL MEDICINE

## 2025-07-23 ASSESSMENT — ENCOUNTER SYMPTOMS
LOSS OF SENSATION IN FEET: 0
DEPRESSION: 0
OCCASIONAL FEELINGS OF UNSTEADINESS: 1
COUGH: 1

## 2025-07-23 ASSESSMENT — ACTIVITIES OF DAILY LIVING (ADL)
TAKING_MEDICATION: INDEPENDENT
GROCERY_SHOPPING: INDEPENDENT
BATHING: INDEPENDENT
DRESSING: INDEPENDENT
DOING_HOUSEWORK: NEEDS ASSISTANCE
MANAGING_FINANCES: INDEPENDENT

## 2025-07-23 NOTE — PROGRESS NOTES
"Subjective   Reason for Visit: Joy L Pallant is an 70 y.o. female here for a Medicare Wellness visit and follow up.    Past Medical, Surgical, and Family History reviewed and updated in chart.    Reviewed all medications by prescribing practitioner or clinical pharmacist (such as prescriptions, OTCs, herbal therapies and supplements) and documented in the medical record.    Cough      Medicare wellness    Follow up    Cough in am  Resolves once up and moving  Mostly clear, no fever or dyspnea  Since CPAP  Follow up with sleep      H/o Abnormal mammogram right  Recheck 5-26     PHILLIP on CPAP  Sleep following     A Fib in sinus now  (none since last visit)  CHF stable  Cardio following     Potassium stable  On 20 meq bid     Allergic rhinitis  better     Hypertension stable on therapy no side effects     Hyperglycemia on diet  HBA1C  6.3   10-24     History of tongue cancer stable ENT following     Hypercholesterolemia stable on rx no side effects     Osteopenia  /  Vitamin D deficiency on supplementation no side effects     Diet and exercise reviewed    Patient Care Team:  Mary Jane Alcantar MD as PCP - General (Internal Medicine)  Mary Jane Alcantar MD as PCP - [facility] Attributed Provider     Review of Systems   Respiratory:  Positive for cough.    All other systems reviewed and are negative.      Objective   Vitals:  /66   Pulse 62   Temp 36.2 °C (97.2 °F)   Ht 1.676 m (5' 6\")   Wt 126 kg (278 lb)   SpO2 99%   BMI 44.87 kg/m²       Physical Exam  Vitals reviewed.   Constitutional:       Appearance: Normal appearance. She is obese.   HENT:      Head: Normocephalic and atraumatic.      Mouth/Throat:      Pharynx: No posterior oropharyngeal erythema.     Eyes:      General: No scleral icterus.     Conjunctiva/sclera: Conjunctivae normal.      Pupils: Pupils are equal, round, and reactive to light.       Cardiovascular:      Rate and Rhythm: Normal rate and regular rhythm.      Heart sounds: Normal heart " sounds.   Pulmonary:      Effort: No respiratory distress.      Breath sounds: No wheezing.   Abdominal:      General: Abdomen is flat. Bowel sounds are normal. There is no distension.      Palpations: Abdomen is soft. There is no mass.      Tenderness: There is no abdominal tenderness. There is no rebound.     Musculoskeletal:         General: Normal range of motion.      Cervical back: Normal range of motion and neck supple.     Skin:     General: Skin is warm and dry.     Neurological:      General: No focal deficit present.      Mental Status: She is alert and oriented to person, place, and time. Mental status is at baseline.     Psychiatric:         Mood and Affect: Mood normal.         Behavior: Behavior normal.         Thought Content: Thought content normal.         Judgment: Judgment normal.         Assessment & Plan  Encounter for subsequent annual wellness visit (AWV) in Medicare patient         Longstanding persistent atrial fibrillation (Multi)    Orders:    CBC and Auto Differential; Future    Hypercholesteremia    Orders:    Comprehensive Metabolic Panel; Future    Lipid Panel; Future    TSH with reflex to Free T4 if abnormal; Future    Hypertension, unspecified type         Hyperglycemia    Orders:    Hemoglobin A1C; Future    Hypokalemia         PHILLIP on CPAP         Malignant neoplasm of tongue (Multi)         Vitamin D deficiency    Orders:    Vitamin D 25-Hydroxy,Total (for eval of Vitamin D levels); Future    Class 3 severe obesity due to excess calories with serious comorbidity and body mass index (BMI) of 40.0 to 44.9 in adult                      Medicare wellness    Follow up    Cough in am  Resolves once up and moving  Mostly clear, no fever or dyspnea  Since CPAP  Follow up with sleep      H/o Abnormal mammogram right  Recheck 5-26     PHILLIP on CPAP  Sleep following     A Fib in sinus now  (none since last visit)  CHF stable  Cardio following     Potassium stable  On 20 meq bid     Allergic  rhinitis  better     Hypertension stable on therapy no side effects     Hyperglycemia on diet  HBA1C  6.3   10-24     History of tongue cancer stable ENT following     Hypercholesterolemia stable on rx no side effects     Osteopenia  /  Vitamin D deficiency on supplementation no side effects     Diet and exercise reviewed    Mammogram 5-25    Dexa 3-23 osteopenia  Colonoscopy pt declined  Immunization UTD  BMI 44.8     Follow up 3 months

## 2025-10-15 ENCOUNTER — APPOINTMENT (OUTPATIENT)
Dept: PRIMARY CARE | Facility: CLINIC | Age: 70
End: 2025-10-15
Payer: MEDICARE

## 2026-05-13 ENCOUNTER — APPOINTMENT (OUTPATIENT)
Dept: SLEEP MEDICINE | Facility: CLINIC | Age: 71
End: 2026-05-13
Payer: MEDICARE